# Patient Record
Sex: FEMALE | Race: WHITE | NOT HISPANIC OR LATINO | Employment: STUDENT | URBAN - METROPOLITAN AREA
[De-identification: names, ages, dates, MRNs, and addresses within clinical notes are randomized per-mention and may not be internally consistent; named-entity substitution may affect disease eponyms.]

---

## 2023-02-27 LAB
ALANINE AMINOTRANSFERASE (SGPT) (U/L) IN SER/PLAS: 12 U/L (ref 7–45)
ALBUMIN (G/DL) IN SER/PLAS: 4.6 G/DL (ref 3.4–5)
ALKALINE PHOSPHATASE (U/L) IN SER/PLAS: 84 U/L (ref 33–110)
ANION GAP IN SER/PLAS: 10 MMOL/L (ref 10–20)
ASPARTATE AMINOTRANSFERASE (SGOT) (U/L) IN SER/PLAS: 14 U/L (ref 9–39)
BASOPHILS (10*3/UL) IN BLOOD BY AUTOMATED COUNT: 0.07 X10E9/L (ref 0–0.1)
BASOPHILS/100 LEUKOCYTES IN BLOOD BY AUTOMATED COUNT: 1 % (ref 0–2)
BILIRUBIN TOTAL (MG/DL) IN SER/PLAS: 1.7 MG/DL (ref 0–1.2)
C REACTIVE PROTEIN (MG/L) IN SER/PLAS: <0.1 MG/DL
CALCIDIOL (25 OH VITAMIN D3) (NG/ML) IN SER/PLAS: 29 NG/ML
CALCIUM (MG/DL) IN SER/PLAS: 9.6 MG/DL (ref 8.6–10.6)
CARBON DIOXIDE, TOTAL (MMOL/L) IN SER/PLAS: 29 MMOL/L (ref 21–32)
CHLORIDE (MMOL/L) IN SER/PLAS: 107 MMOL/L (ref 98–107)
CREATININE (MG/DL) IN SER/PLAS: 0.68 MG/DL (ref 0.5–1.05)
EOSINOPHILS (10*3/UL) IN BLOOD BY AUTOMATED COUNT: 0.58 X10E9/L (ref 0–0.7)
EOSINOPHILS/100 LEUKOCYTES IN BLOOD BY AUTOMATED COUNT: 8.3 % (ref 0–6)
ERYTHROCYTE DISTRIBUTION WIDTH (RATIO) BY AUTOMATED COUNT: 12.4 % (ref 11.5–14.5)
ERYTHROCYTE MEAN CORPUSCULAR HEMOGLOBIN CONCENTRATION (G/DL) BY AUTOMATED: 33.5 G/DL (ref 32–36)
ERYTHROCYTE MEAN CORPUSCULAR VOLUME (FL) BY AUTOMATED COUNT: 86 FL (ref 80–100)
ERYTHROCYTES (10*6/UL) IN BLOOD BY AUTOMATED COUNT: 4.36 X10E12/L (ref 4–5.2)
GAMMA GLUTAMYL TRANSFERASE (U/L) IN SER/PLAS: 14 U/L (ref 5–55)
GFR FEMALE: >90 ML/MIN/1.73M2
GLUCOSE (MG/DL) IN SER/PLAS: 68 MG/DL (ref 74–99)
HEMATOCRIT (%) IN BLOOD BY AUTOMATED COUNT: 37.6 % (ref 36–46)
HEMOGLOBIN (G/DL) IN BLOOD: 12.6 G/DL (ref 12–16)
IMMATURE GRANULOCYTES/100 LEUKOCYTES IN BLOOD BY AUTOMATED COUNT: 0.3 % (ref 0–0.9)
LEUKOCYTES (10*3/UL) IN BLOOD BY AUTOMATED COUNT: 7 X10E9/L (ref 4.4–11.3)
LYMPHOCYTES (10*3/UL) IN BLOOD BY AUTOMATED COUNT: 2.58 X10E9/L (ref 1.2–4.8)
LYMPHOCYTES/100 LEUKOCYTES IN BLOOD BY AUTOMATED COUNT: 37 % (ref 13–44)
MONOCYTES (10*3/UL) IN BLOOD BY AUTOMATED COUNT: 0.38 X10E9/L (ref 0.1–1)
MONOCYTES/100 LEUKOCYTES IN BLOOD BY AUTOMATED COUNT: 5.5 % (ref 2–10)
NEUTROPHILS (10*3/UL) IN BLOOD BY AUTOMATED COUNT: 3.34 X10E9/L (ref 1.2–7.7)
NEUTROPHILS/100 LEUKOCYTES IN BLOOD BY AUTOMATED COUNT: 47.9 % (ref 40–80)
NRBC (PER 100 WBCS) BY AUTOMATED COUNT: 0 /100 WBC (ref 0–0)
PLATELETS (10*3/UL) IN BLOOD AUTOMATED COUNT: 251 X10E9/L (ref 150–450)
POTASSIUM (MMOL/L) IN SER/PLAS: 3.9 MMOL/L (ref 3.5–5.3)
PROTEIN TOTAL: 6.7 G/DL (ref 6.4–8.2)
SEDIMENTATION RATE, ERYTHROCYTE: 2 MM/H (ref 0–20)
SODIUM (MMOL/L) IN SER/PLAS: 142 MMOL/L (ref 136–145)
UREA NITROGEN (MG/DL) IN SER/PLAS: 11 MG/DL (ref 6–23)

## 2023-03-03 LAB
AB TO ADALIMUMAB(ATA) CONC.: <1.7 U/ML
ADA RESULTS: ABNORMAL
ADALIMUMAB(ADA) CONC.: 17 UG/ML

## 2023-03-04 LAB
NIL(NEG) CONTROL SPOT COUNT: NORMAL
PANEL A SPOT COUNT: 0
PANEL B SPOT COUNT: 1
POS CONTROL SPOT COUNT: NORMAL
T-SPOT. TB INTERPRETATION: NEGATIVE

## 2023-10-05 ENCOUNTER — DOCUMENTATION (OUTPATIENT)
Dept: PEDIATRIC GASTROENTEROLOGY | Facility: HOSPITAL | Age: 18
End: 2023-10-05
Payer: COMMERCIAL

## 2023-10-05 NOTE — PROGRESS NOTES
Concerns regarding needed follow up procedure not being scheduled. [employer] mailed a 90 day warning letter to the family/patient via certified mail requesting that the family/patient schedule a GI follow up. A copy of the letter will be scanned into the chart.

## 2023-10-07 ENCOUNTER — TELEPHONE (OUTPATIENT)
Dept: PEDIATRIC GASTROENTEROLOGY | Facility: CLINIC | Age: 18
End: 2023-10-07
Payer: COMMERCIAL

## 2023-10-26 DIAGNOSIS — K50.919 CROHN'S DISEASE WITH COMPLICATION, UNSPECIFIED GASTROINTESTINAL TRACT LOCATION (MULTI): ICD-10-CM

## 2023-12-11 ENCOUNTER — TELEPHONE (OUTPATIENT)
Dept: PEDIATRIC GASTROENTEROLOGY | Facility: CLINIC | Age: 18
End: 2023-12-11
Payer: COMMERCIAL

## 2023-12-11 NOTE — TELEPHONE ENCOUNTER
Attempted to contact to remind patient of IBD Tranistion appointment. Unable to reach at this time, left voicemail with details of appointment.

## 2023-12-20 ENCOUNTER — MEDICATION MANAGEMENT (OUTPATIENT)
Dept: PEDIATRIC GASTROENTEROLOGY | Facility: CLINIC | Age: 18
End: 2023-12-20

## 2023-12-20 ENCOUNTER — NUTRITION (OUTPATIENT)
Dept: PEDIATRIC GASTROENTEROLOGY | Facility: CLINIC | Age: 18
End: 2023-12-20

## 2023-12-20 ENCOUNTER — OFFICE VISIT (OUTPATIENT)
Dept: PEDIATRIC GASTROENTEROLOGY | Facility: CLINIC | Age: 18
End: 2023-12-20
Payer: COMMERCIAL

## 2023-12-20 VITALS
TEMPERATURE: 96.6 F | WEIGHT: 124.78 LBS | BODY MASS INDEX: 20.79 KG/M2 | SYSTOLIC BLOOD PRESSURE: 128 MMHG | DIASTOLIC BLOOD PRESSURE: 82 MMHG | HEART RATE: 80 BPM | HEIGHT: 65 IN

## 2023-12-20 DIAGNOSIS — K51.00 ULCERATIVE PANCOLITIS WITHOUT COMPLICATION (MULTI): Primary | ICD-10-CM

## 2023-12-20 PROCEDURE — 99215 OFFICE O/P EST HI 40 MIN: CPT | Performed by: STUDENT IN AN ORGANIZED HEALTH CARE EDUCATION/TRAINING PROGRAM

## 2023-12-20 ASSESSMENT — ENCOUNTER SYMPTOMS
BLOOD IN STOOL: 0
NUMBER OF DAILY STOOLS PAST SEVEN DAYS: 2
NUMBER OF DAILY LIQUID STOOLS PAST SEVEN DAYS: 0
FEVER >38.5 C ON THREE OF THE PAST SEVEN DAYS: 0
STOOL DESCRIPTION: FORMED

## 2023-12-20 NOTE — PROGRESS NOTES
DISEASE MAINTENANCE MEDICATION: Humira 40 mg every other week    TOLERANCE:   Have you experienced any side effects from this medication?     -Vanessa has experienced bruising, raised area for a few day, itchng for a day.  -We talked about appropriate administration and she confimed she has started to place Humira in a more “meatier” area and pressing lighter.  Bruising is lessened.    Are there any changes to current therapy regimen? No    EFFICACY:    Have you developed any new symptoms of your condition? No    How do you feel your medication is affecting your disease state?     -She feels normal    GOALS:  Your goals of therapy are: No symptoms    COMPLIANCE / ADHERENCE:  Have you had any unplanned missed doses? No    Does the patient have any barriers to self-administration (including physical and mental)? No    GENERAL ASSESSMENT:  Are there any changes to your home medications, OTCs or supplements? No changes, on no any other meds    Do you have any new allergies? No     Have you been diagnosed with any new medical conditions? No    PATIENT MANAGEMENT:    Do you have any questions regarding your medications, or care?     -None other than the administration side effects    Do you have any concerns with access to your medications? No    IMPRESSION/PLAN:     Doing well on Humira

## 2023-12-20 NOTE — PROGRESS NOTES
IBD CLINIC  Nutrition Intervention: Nutrition Therapy Education Session -IBD Multidisciplinary Clinic  Nutrition and GI Concerns:  No concerns. 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 Homecooked meals at least twice daily + snacks during day.  We are happy that her weight is up and Vanessa states she is too.  Today provided general healthy eating guidelines for IBD and micronutrient intake guidelines with IBD.  Patient was very receptive.

## 2023-12-20 NOTE — PROGRESS NOTES
ICN NOTEFORM  Vanessa is a 18 y.o. female with ulcerative colitis. Colectomy status: has not had a complete colectomy    18 year old F with ulcerative pancolitis (E4S1) diagnosed in 2019 who presents for follow up in  [facility].  This is a transition visit.    Briefly, she presented in 2019 with hematochezia and abdominal pain with precipitous drop in Hemoglobin.  She underwent EGD and colonoscopy which showed a pancolitis visually with biopsies notable for a normal TI, chronic colitis throughout the colon except for a normal transverse colon histologically.  Subsequent MRE showed possible mild TI thickening vs under distension.  Her picture was most consistent with IBD, UC picture at the time.  She received IV iron, IV steroids and was started on infliximab for which dose was optimized due to low levels and symptoms with subsequent mucosal and essentially deep healing on follow up endoscopies in 2020.  She did have a flare in disease in late 2021 in setting of missing infliximab infusions x 6 months due to lapse of insurance and was admitted for IV steroids and induction with adalimumab.  When she was last seen in February 2023, she was clinically doing well on 40 mg every other week of adalimumab with normal labs and an therapeutic adalimumab level of 17 but with continued weight loss >6 months so repeat EGD/colonoscopy was recommended.    Today she presents for follow up and is doing very well.  She has 1-2 formed BM per day, no blood, no nocturnal symptoms, no abdominal pain and no vomiting.  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  Her appetite is great.  She denies fevers, joint swelling, rashes, oral ulcers.  Is due for a dilated eye exam.    IBD History:  -Initial EGD/colonoscopy June 21, 2019: erythema, friability, inflammation from rectum to ascending colon, erythema and friable cecum.   "Biopsies with chronic gastritis, and chronic colitis throughout the colon except for a normal transverse colon biopsy  -Most recent EGD/colonoscopy August 14, 2020: normal colon and terminal ileum, biopsies with minimal LP edema in the left colon otherwise normal  -MR Enterography June 24, 2019: mild wall thickening and slightly increased enhancement of TI which may be due to under distension, normal colon   -Anser ADA 2/27/2023: 17, no antibodies    Extent of disease involvement   The extent of ulcerative colitis involvement:  pancolitis  There has been an episode of severe disease    Current symptoms (on the worst day in past 7 days)  She reports on the worst day her general well-being is normal.     Limitations in daily activities were described as: no limitations.    Abdominal pain: none.    Stool number on the worst day in past 7 days: 2  .  The number of liquid/watery stools per day was 0  .  Most of the stools were described as formed.     Nocturnal diarrhea: no  .  She reported no bloody stools  .   .    Extraintestinal manifestations:   Fever greater than 38.5C for 3 of last 7 days: no  Definite arthritis: no   Uveitis: no   Erythema nodosum:  no  Pyoderma gangrenosum: no     Blood pressure 128/82, pulse 80, temperature 35.9 °C (96.6 °F), height 1.663 m (5' 5.47\"), weight 56.6 kg (124 lb 12.5 oz).     Review of Systems:  All other systems have been reviewed and are negative for complaints unless stated in the HPI     Physical Exam:  Constitutional: in NAD  Head: atraumatic  Eyes: anicteric sclera, normal conjunctiva  Mouth: MMM  Respiratory: Breathing unlabored  CARD: no murmurs, normal S1/S2  Abdomen: soft, not tender, non distended, no organomegaly  Skin: no rashes  MSK: no joint swelling or erythema  Neuro: alert, moving all extremities      ICN Assessment:  Based on current information, my global assessment of current disease status is her disease is quiescent.   Vanessa's growth status is satisfactory.  " The overall nutritional status is satisfactory.      18 year old F with ulcerative pancolitis on Humira monotherapy in clinical remission.  She had issues with poor weight gain which have recently improved.  She will follow up in [place] with adult GI and I recommended her making appointment ASAP as Her [employer] assistance program forms need to be filled out for the following year in the new year.  TRAQ 92.    Her primary gastroenterologist will be Harry Jennings MD.

## 2023-12-21 ENCOUNTER — TELEPHONE (OUTPATIENT)
Dept: GASTROENTEROLOGY | Facility: CLINIC | Age: 18
End: 2023-12-21
Payer: COMMERCIAL

## 2024-01-29 NOTE — PROGRESS NOTES
Subjective     History of Present Illness:   Vanessa Metz is a 18 y.o. female who presents to GI clinic for ulcerative colitis.  She is transitioning from pediatric GI.  Please see following note.  Briefly, she presented in 2019 with hematochezia and abdominal pain with precipitous drop in Hemoglobin.  She underwent EGD and colonoscopy which showed a pancolitis visually with biopsies notable for a normal TI, chronic colitis throughout the colon except for a normal transverse colon histologically.  Subsequent MRE showed possible mild TI thickening vs under distension.  Her picture was most consistent with IBD, UC picture at the time.  She received IV iron, IV steroids and was started on infliximab for which dose was optimized due to low levels and symptoms with subsequent mucosal and essentially deep healing on follow up endoscopies in 2020.  She did have a flare in disease in late 2021 in setting of missing infliximab infusions x 6 months due to lapse of insurance and was admitted for IV steroids and induction with adalimumab.  When she was last seen in February 2023, she was clinically doing well on 40 mg every other week of adalimumab with normal labs and an therapeutic adalimumab level of 17 but with continued weight loss >6 months so repeat EGD/colonoscopy was recommended.  However the conclusion of the pediatric GI is that her colitis is quiescent.    Today, patient reported that she is here for transition of her care to the adult GI.  She does not have any main concerns.  She is having 1-2 formed bowel movements without any blood not associated with urgency.  She denied any nocturnal stools, joint pains, rash, mouth ulcers or sores.  She has gained 10 pounds in the last couple of months.  She relates that to her increased appetite.      IBD History:  -Initial EGD/colonoscopy June 21, 2019: erythema, friability, inflammation from rectum to ascending colon, erythema and friable cecum.  Biopsies with chronic  gastritis, and chronic colitis throughout the colon except for a normal transverse colon biopsy  -Most recent EGD/colonoscopy August 14, 2020: normal colon and terminal ileum, biopsies with minimal LP edema in the left colon otherwise normal  -MR Enterography June 24, 2019: mild wall thickening and slightly increased enhancement of TI which may be due to under distension, normal colon   -Anser ADA 2/27/2023: 17, no antibodies         Review of Systems    A 12 point review of system is negative except as above.  Past Medical History   has a past medical history of Other conditions influencing health status.     Social History    Smokes 1 to 2 cigarettes/day, denies drinking alcohol.    Family History  family history is not on file.     Allergies  Not on File    Medications  Current Outpatient Medications   Medication Instructions    adalimumab (Humira Pen) 40 mg/0.4 mL pen injector kit pen-injector INJECT 40 MG (1 PEN) UNDER THE SKIN EVERY OTHER WEEK. KEEP REFRIGERATED.        Objective     Physical Exam  Vitals signs reviewed.    General: not in acute distress  HEENT: atraumatic, normocephalic, no oral lesions  CV: regular rate and rhythm, no murmur  Resp: clear to auscultation bilaterally  GI: soft, active bowel sounds, non-tender to palpation, no rebound or guarding, no ascites  Rectal: no perianal lesion or purulence  Msk: no lower extremity edema  Derm: no jaundice  Psych: normal affect      [unfilled]       Assessment/Plan   Vanessa Metz is a 18 y.o. female who presents to GI clinic for To transition her care from pediatric GI for her ulcerative colitis.    She has been on Humira since February 2023 and her symptoms appear to be well-controlled.  Her last colonoscopy was in 2020.  We discussed that she needs repeat colonoscopy to look for endoscopic remission.  Her weight has been stable, she has gained 10 pounds over the last few months.    Plan:  - Colonoscopy at [facility] to evaluate for  ulcerative colitis.  - Will continue Humira 40 mg every other week.  - Advised smoking cessation and to keep her vaccinations up-to-date.  - Follow-up in a year or sooner if needed.          Eligio Nash MD

## 2024-01-30 ENCOUNTER — OFFICE VISIT (OUTPATIENT)
Dept: GASTROENTEROLOGY | Facility: CLINIC | Age: 19
End: 2024-01-30
Payer: COMMERCIAL

## 2024-01-30 VITALS
DIASTOLIC BLOOD PRESSURE: 61 MMHG | BODY MASS INDEX: 20.57 KG/M2 | HEIGHT: 66 IN | SYSTOLIC BLOOD PRESSURE: 109 MMHG | HEART RATE: 85 BPM | WEIGHT: 128 LBS

## 2024-01-30 DIAGNOSIS — K51.00 ULCERATIVE PANCOLITIS WITHOUT COMPLICATION (MULTI): Primary | ICD-10-CM

## 2024-01-30 PROCEDURE — 99204 OFFICE O/P NEW MOD 45 MIN: CPT | Performed by: INTERNAL MEDICINE

## 2024-01-30 RX ORDER — SODIUM CHLORIDE 9 MG/ML
20 INJECTION, SOLUTION INTRAVENOUS CONTINUOUS
Status: CANCELLED | OUTPATIENT
Start: 2024-01-30

## 2024-01-30 RX ORDER — SODIUM, POTASSIUM,MAG SULFATES 17.5-3.13G
1 SOLUTION, RECONSTITUTED, ORAL ORAL 2 TIMES DAILY
Qty: 2 EACH | Refills: 0 | Status: SHIPPED | OUTPATIENT
Start: 2024-01-30 | End: 2024-03-20 | Stop reason: ALTCHOICE

## 2024-02-08 ENCOUNTER — TELEPHONE (OUTPATIENT)
Dept: OPERATING ROOM | Facility: HOSPITAL | Age: 19
End: 2024-02-08
Payer: COMMERCIAL

## 2024-02-08 NOTE — TELEPHONE ENCOUNTER
Attempted to call Mom to schedule GI endoscopy procedures. No answer at this time, left message at [phone]. Instruction for recipient to call back at [phone]     Call attempt: 6

## 2024-03-05 ENCOUNTER — TELEPHONE (OUTPATIENT)
Dept: PEDIATRIC GASTROENTEROLOGY | Facility: HOSPITAL | Age: 19
End: 2024-03-05
Payer: COMMERCIAL

## 2024-03-05 ENCOUNTER — ANESTHESIA EVENT (OUTPATIENT)
Dept: GASTROENTEROLOGY | Facility: EXTERNAL LOCATION | Age: 19
End: 2024-03-05

## 2024-03-20 ENCOUNTER — ANESTHESIA (OUTPATIENT)
Dept: GASTROENTEROLOGY | Facility: EXTERNAL LOCATION | Age: 19
End: 2024-03-20

## 2024-03-20 ENCOUNTER — HOSPITAL ENCOUNTER (OUTPATIENT)
Dept: GASTROENTEROLOGY | Facility: EXTERNAL LOCATION | Age: 19
Discharge: HOME | End: 2024-03-20
Payer: COMMERCIAL

## 2024-03-20 ENCOUNTER — SPECIALTY PHARMACY (OUTPATIENT)
Dept: PHARMACY | Facility: CLINIC | Age: 19
End: 2024-03-20

## 2024-03-20 VITALS
RESPIRATION RATE: 13 BRPM | SYSTOLIC BLOOD PRESSURE: 101 MMHG | HEIGHT: 66 IN | DIASTOLIC BLOOD PRESSURE: 72 MMHG | BODY MASS INDEX: 20.57 KG/M2 | WEIGHT: 128 LBS | HEART RATE: 77 BPM | OXYGEN SATURATION: 99 % | TEMPERATURE: 98.1 F

## 2024-03-20 DIAGNOSIS — K50.118: ICD-10-CM

## 2024-03-20 PROBLEM — F17.200 SMOKER: Status: ACTIVE | Noted: 2024-03-20

## 2024-03-20 PROCEDURE — 88305 TISSUE EXAM BY PATHOLOGIST: CPT | Performed by: PATHOLOGY

## 2024-03-20 PROCEDURE — 45380 COLONOSCOPY AND BIOPSY: CPT | Performed by: INTERNAL MEDICINE

## 2024-03-20 PROCEDURE — 88305 TISSUE EXAM BY PATHOLOGIST: CPT | Mod: TC | Performed by: INTERNAL MEDICINE

## 2024-03-20 RX ORDER — SODIUM CHLORIDE 9 MG/ML
20 INJECTION, SOLUTION INTRAVENOUS CONTINUOUS
Status: DISCONTINUED | OUTPATIENT
Start: 2024-03-20 | End: 2024-03-21 | Stop reason: HOSPADM

## 2024-03-20 RX ORDER — PROPOFOL 10 MG/ML
INJECTION, EMULSION INTRAVENOUS AS NEEDED
Status: DISCONTINUED | OUTPATIENT
Start: 2024-03-20 | End: 2024-03-20

## 2024-03-20 RX ORDER — LIDOCAINE HYDROCHLORIDE 20 MG/ML
INJECTION, SOLUTION INFILTRATION; PERINEURAL AS NEEDED
Status: DISCONTINUED | OUTPATIENT
Start: 2024-03-20 | End: 2024-03-20

## 2024-03-20 RX ADMIN — PROPOFOL 20 MG: 10 INJECTION, EMULSION INTRAVENOUS at 14:05

## 2024-03-20 RX ADMIN — PROPOFOL 100 MG: 10 INJECTION, EMULSION INTRAVENOUS at 14:00

## 2024-03-20 RX ADMIN — PROPOFOL 30 MG: 10 INJECTION, EMULSION INTRAVENOUS at 14:03

## 2024-03-20 RX ADMIN — PROPOFOL 100 MG: 10 INJECTION, EMULSION INTRAVENOUS at 13:51

## 2024-03-20 RX ADMIN — SODIUM CHLORIDE: 9 INJECTION, SOLUTION INTRAVENOUS at 13:48

## 2024-03-20 RX ADMIN — LIDOCAINE HYDROCHLORIDE 20 MG: 20 INJECTION, SOLUTION INFILTRATION; PERINEURAL at 13:51

## 2024-03-20 SDOH — HEALTH STABILITY: MENTAL HEALTH: CURRENT SMOKER: 1

## 2024-03-20 ASSESSMENT — PAIN - FUNCTIONAL ASSESSMENT
PAIN_FUNCTIONAL_ASSESSMENT: 0-10

## 2024-03-20 ASSESSMENT — COLUMBIA-SUICIDE SEVERITY RATING SCALE - C-SSRS
6. HAVE YOU EVER DONE ANYTHING, STARTED TO DO ANYTHING, OR PREPARED TO DO ANYTHING TO END YOUR LIFE?: NO
2. HAVE YOU ACTUALLY HAD ANY THOUGHTS OF KILLING YOURSELF?: NO
1. IN THE PAST MONTH, HAVE YOU WISHED YOU WERE DEAD OR WISHED YOU COULD GO TO SLEEP AND NOT WAKE UP?: NO

## 2024-03-20 ASSESSMENT — PAIN SCALES - GENERAL
PAINLEVEL_OUTOF10: 0 - NO PAIN
PAIN_LEVEL: 0
PAINLEVEL_OUTOF10: 0 - NO PAIN

## 2024-03-20 NOTE — ANESTHESIA POSTPROCEDURE EVALUATION
Patient: Vanessa Metz    Procedure Summary       Date: 03/20/24 Room / Location: [facility]    Anesthesia Start: 1349 Anesthesia Stop:     Procedure: COLONOSCOPY Diagnosis: CC (Crohn's colitis), other complication (CMS/HCC)    Scheduled Providers: Eligio Nash MD; Kirsty Rock RN; DAMI Fang Responsible Provider: DAMI Fang    Anesthesia Type: MAC ASA Status: 2            Anesthesia Type: MAC    Vitals Value Taken Time   BP 96/63 03/20/24 1411   Temp 36.7 03/20/24 1411   Pulse 81 03/20/24 1411   Resp 16 03/20/24 1411   SpO2 97 03/20/24 1411       Anesthesia Post Evaluation    Patient location during evaluation: bedside  Patient participation: complete - patient participated  Level of consciousness: awake  Pain score: 0  Pain management: adequate  Airway patency: patent  Cardiovascular status: acceptable  Respiratory status: acceptable and room air  Hydration status: acceptable  Postoperative Nausea and Vomiting: none      No notable events documented.

## 2024-03-20 NOTE — ANESTHESIA PREPROCEDURE EVALUATION
Patient: Vanessa Metz    Procedure Information       Date/Time: 03/20/24 1300    Scheduled providers: Eligio Nash MD; Kirsty Rock RN; DAMI Fang    Procedure: COLONOSCOPY    Location: [facility]            Relevant Problems   No relevant active problems       Clinical information reviewed:   Tobacco  Allergies  Meds   Med Hx  Surg Hx  OB Status  Fam Hx  Soc   Hx        NPO Detail:  NPO/Void Status  Carbohydrate Drink Given Prior to Surgery? : N  Date of Last Liquid: 03/20/24  Time of Last Liquid: 0800  Date of Last Solid: 03/18/24  Time of Last Solid: 1800  Last Intake Type: Clear fluids  Time of Last Void: 1313         Physical Exam    Airway  Mallampati: I  TM distance: >3 FB  Neck ROM: full     Cardiovascular - normal exam     Dental    Pulmonary - normal exam     Abdominal - normal exam         Anesthesia Plan    History of general anesthesia?: yes  History of complications of general anesthesia?: no    ASA 2     MAC   (Preoxygenated 2L prior to procedure.  Patient positioned self to comfort prior to sedation administered; eyes closed; continuous monitoring)  The patient is a current smoker.  Patient was previously instructed to abstain from smoking on day of procedure.  Patient did not smoke on day of procedure.    intravenous induction   Anesthetic plan and risks discussed with patient.    Plan discussed with CRNA.

## 2024-03-20 NOTE — DISCHARGE INSTRUCTIONS
Patient Instructions Post Procedure      The anesthetics, sedatives or narcotics which were given to you today will be acting in your body for the next 24 hours, so you might feel a little sleepy or groggy.  This feeling should slowly wear off. Carefully read and follow the instructions.     You received sedation today:  - Do not drive or operate any machinery or power tools of any kind.   - No alcoholic beverages today, not even beer or wine.  - Do not make any important decisions or sign any legal documents.  - No over the counter medications that contain alcohol or that may cause drowsiness.    While it is common to experience mild to moderate abdominal distention, gas, or belching after your procedure, if any of these symptoms occur following discharge from the GI Lab or within one week of having your procedure, call the [facility] [place] to be advised whether a visit to your nearest Urgent Care or Emergency Department is indicated.  Take this paper with you if you go.   - If you develop an allergic reaction to the medications that were given during your procedure such as difficulty breathing, rash, hives, severe nausea, vomiting or lightheadedness.  - If you experience chest pain, shortness of breath, severe abdominal pain, fevers and chills.  -If you develop signs and symptoms of bleeding such as blood in your spit, if your stools turn black, tarry, or bloody  - If you have not urinated within 8 hours following your procedure.  - If your IV site becomes painful, red, inflamed, or looks infected.    If you received a biopsy/polypectomy/sphincterotomy the following instructions apply below:  __ Do not use Aspirin containing products, non-steroidal medications or anti-coagulants for one week following your procedure. (Examples of these types of medications are: Advil, Arthrotec, Aleve, Coumadin, Ecotrin, Heparin, Ibuprofen, Indocin, Motrin, Naprosyn, Nuprin, Plavix, Vioxx, and Voltarin, or their generic  forms.  This list is not all-inclusive.  Check with your physician or pharmacist before resuming medications.)   __ Eat a soft diet today.  Avoid foods that are poorly digested for the next 24 hours.  These foods would include: nuts, beans, lettuce, red meats, and fried foods. Start with liquids and advance your diet as tolerated, gradually work up to eating solids.   __ Do not have a Barium Study or Enema for one week.    Your physician recommends the additional following instructions:    -You have a contact number available for emergencies. The signs and symptoms of potential delayed complications were discussed with you. You may return to normal activities tomorrow.  -Resume your previous diet or other if specified.  -Continue your present medications.   -We are waiting for your pathology results, if applicable.  -The findings and recommendations have been discussed with you and/or family.  - Please see Medication Reconciliation Form for new medication/medications prescribed.     If you experience any problems or have any questions following discharge from the GI Lab, please call: [phone] from 7 am- 4:30 pm.  In the event of an emergency please go to the closest Emergency Department or call Dr. Nash [phone] for any physician related concerns or if is after 5pm, a holiday or weekend

## 2024-03-20 NOTE — SIGNIFICANT EVENT
Physician at bedside to discuss procedure results with patient  Ride updated for discharge time via phone  Tolerating PO fluids

## 2024-03-20 NOTE — H&P
Procedure H&P    Patient Profile-Procedures  Name Vanessa Metz  Date of Birth 2005  MRN [id number]  Address   [address] [id number] [place] [place] [address]    Primary Phone Number [phone]  Secondary Phone Number    PCP Willy Keller    Procedure(s):  Procedures: Colonoscopy  Primary contact name and number   Extended Emergency Contact Information  Primary Emergency Contact: Sagrario Metz  Home Phone: [phone]  Relation: Parent    General Health  Weight   Vitals:    03/20/24 1312   Weight: 58.1 kg (128 lb)     BMI Body mass index is 20.66 kg/m².    Allergies  No Known Allergies    Past Medical History   Past Medical History:   Diagnosis Date    Other conditions influencing health status     No prior hospitalisations    Ulcerative colitis (CMS/[facility])        Provider assessment  Diagnosis:  UC  Medication Reviewed - yes  Prior to Admission medications    Medication Sig Start Date End Date Taking? Authorizing Provider   adalimumab (Humira Pen) 40 mg/0.4 mL pen injector kit pen-injector Inject 1 Pen (40 mg) under the skin every 14 (fourteen) days.   Yes Historical Provider, MD   sodium,potassium,mag sulfates (Suprep) 17.5-3.13-1.6 gram recon soln solution Take 1 bottle by mouth 2 times a day. 1/30/24 3/20/24  Eligio Nash MD       Physical Exam  Vitals:    03/20/24 1312   BP: 108/72   Pulse: 87   Resp: 18   Temp: 37 °C (98.6 °F)   SpO2: 100%        General: A&Ox3, NAD.  HEENT: AT/NC.   CV: RRR. No murmur.  Resp: CTA bilaterally. No wheezing, rhonchi or rales.   GI: Soft, NT/ND. BSx4.  Extrem: No edema. Pulses intact.  Neuro: No focal deficits.   Psych: Normal mood and affect.      Procedure Plan - pre-procedural (re)assesment completed by physician:  discharge/transfer patient when discharge criteria met    ASA status 2  Mallampati score 1    Eligio Nash MD  3/20/2024 1:50 PM

## 2024-03-21 ENCOUNTER — TELEPHONE (OUTPATIENT)
Dept: GASTROENTEROLOGY | Facility: CLINIC | Age: 19
End: 2024-03-21
Payer: COMMERCIAL

## 2024-03-21 DIAGNOSIS — K50.118: Primary | ICD-10-CM

## 2024-03-27 LAB
LABORATORY COMMENT REPORT: NORMAL
PATH REPORT.FINAL DX SPEC: NORMAL
PATH REPORT.GROSS SPEC: NORMAL
PATH REPORT.TOTAL CANCER: NORMAL

## 2024-04-02 ENCOUNTER — SPECIALTY PHARMACY (OUTPATIENT)
Dept: PHARMACY | Facility: CLINIC | Age: 19
End: 2024-04-02

## 2024-04-03 ENCOUNTER — PHARMACY VISIT (OUTPATIENT)
Dept: PHARMACY | Facility: CLINIC | Age: 19
End: 2024-04-03
Payer: COMMERCIAL

## 2024-04-03 PROCEDURE — RXMED WILLOW AMBULATORY MEDICATION CHARGE

## 2024-04-05 ENCOUNTER — SPECIALTY PHARMACY (OUTPATIENT)
Dept: PHARMACY | Facility: CLINIC | Age: 19
End: 2024-04-05

## 2024-04-23 ENCOUNTER — SPECIALTY PHARMACY (OUTPATIENT)
Dept: PHARMACY | Facility: CLINIC | Age: 19
End: 2024-04-23

## 2024-04-23 PROCEDURE — RXMED WILLOW AMBULATORY MEDICATION CHARGE

## 2024-04-24 ENCOUNTER — PHARMACY VISIT (OUTPATIENT)
Dept: PHARMACY | Facility: CLINIC | Age: 19
End: 2024-04-24
Payer: COMMERCIAL

## 2024-05-16 ENCOUNTER — SPECIALTY PHARMACY (OUTPATIENT)
Dept: PHARMACY | Facility: CLINIC | Age: 19
End: 2024-05-16

## 2024-05-16 PROCEDURE — RXMED WILLOW AMBULATORY MEDICATION CHARGE

## 2024-05-20 ENCOUNTER — PHARMACY VISIT (OUTPATIENT)
Dept: PHARMACY | Facility: CLINIC | Age: 19
End: 2024-05-20
Payer: COMMERCIAL

## 2024-06-02 ENCOUNTER — HOSPITAL ENCOUNTER (INPATIENT)
Facility: HOSPITAL | Age: 19
LOS: 1 days | Discharge: HOME | DRG: 871 | End: 2024-06-04
Attending: EMERGENCY MEDICINE
Payer: COMMERCIAL

## 2024-06-02 ENCOUNTER — APPOINTMENT (OUTPATIENT)
Dept: RADIOLOGY | Facility: HOSPITAL | Age: 19
DRG: 871 | End: 2024-06-02
Payer: COMMERCIAL

## 2024-06-02 DIAGNOSIS — A41.9 SEPSIS WITHOUT SEPTIC SHOCK (MULTI): Primary | ICD-10-CM

## 2024-06-02 DIAGNOSIS — J18.9 PNEUMONIA OF RIGHT LUNG DUE TO INFECTIOUS ORGANISM, UNSPECIFIED PART OF LUNG: ICD-10-CM

## 2024-06-02 DIAGNOSIS — N30.90 CYSTITIS: ICD-10-CM

## 2024-06-02 LAB
ALBUMIN SERPL BCP-MCNC: 3.9 G/DL (ref 3.4–5)
ALP SERPL-CCNC: 84 U/L (ref 33–110)
ALT SERPL W P-5'-P-CCNC: 9 U/L (ref 7–45)
AMORPH CRY #/AREA UR COMP ASSIST: ABNORMAL /HPF
ANION GAP SERPL CALC-SCNC: 13 MMOL/L (ref 10–20)
APPEARANCE UR: ABNORMAL
AST SERPL W P-5'-P-CCNC: 9 U/L (ref 9–39)
B-HCG SERPL-ACNC: 5 MIU/ML
BACTERIA #/AREA URNS AUTO: ABNORMAL /HPF
BASOPHILS # BLD AUTO: 0.05 X10*3/UL (ref 0–0.1)
BASOPHILS NFR BLD AUTO: 0.2 %
BILIRUB SERPL-MCNC: 1.2 MG/DL (ref 0–1.2)
BILIRUB UR STRIP.AUTO-MCNC: NEGATIVE MG/DL
BUN SERPL-MCNC: 7 MG/DL (ref 6–23)
CALCIUM SERPL-MCNC: 9.1 MG/DL (ref 8.6–10.3)
CHLORIDE SERPL-SCNC: 95 MMOL/L (ref 98–107)
CO2 SERPL-SCNC: 24 MMOL/L (ref 21–32)
COLOR UR: ABNORMAL
CREAT SERPL-MCNC: 0.63 MG/DL (ref 0.5–1.05)
EGFRCR SERPLBLD CKD-EPI 2021: >90 ML/MIN/1.73M*2
EOSINOPHIL # BLD AUTO: 0 X10*3/UL (ref 0–0.7)
EOSINOPHIL NFR BLD AUTO: 0 %
ERYTHROCYTE [DISTWIDTH] IN BLOOD BY AUTOMATED COUNT: 12.1 % (ref 11.5–14.5)
GLUCOSE SERPL-MCNC: 176 MG/DL (ref 74–99)
GLUCOSE UR STRIP.AUTO-MCNC: NORMAL MG/DL
HCG UR QL IA.RAPID: NEGATIVE
HCT VFR BLD AUTO: 38.6 % (ref 36–46)
HGB BLD-MCNC: 13.4 G/DL (ref 12–16)
IMM GRANULOCYTES # BLD AUTO: 0.34 X10*3/UL (ref 0–0.7)
IMM GRANULOCYTES NFR BLD AUTO: 1.1 % (ref 0–0.9)
KETONES UR STRIP.AUTO-MCNC: NEGATIVE MG/DL
LACTATE SERPL-SCNC: 0.7 MMOL/L (ref 0.4–2)
LEUKOCYTE ESTERASE UR QL STRIP.AUTO: ABNORMAL
LIPASE SERPL-CCNC: 4 U/L (ref 9–82)
LYMPHOCYTES # BLD AUTO: 1.62 X10*3/UL (ref 1.2–4.8)
LYMPHOCYTES NFR BLD AUTO: 5.4 %
MCH RBC QN AUTO: 30.2 PG (ref 26–34)
MCHC RBC AUTO-ENTMCNC: 34.7 G/DL (ref 32–36)
MCV RBC AUTO: 87 FL (ref 80–100)
MONOCYTES # BLD AUTO: 1.89 X10*3/UL (ref 0.1–1)
MONOCYTES NFR BLD AUTO: 6.4 %
MUCOUS THREADS #/AREA URNS AUTO: ABNORMAL /LPF
NEUTROPHILS # BLD AUTO: 25.84 X10*3/UL (ref 1.2–7.7)
NEUTROPHILS NFR BLD AUTO: 86.9 %
NITRITE UR QL STRIP.AUTO: NEGATIVE
NRBC BLD-RTO: 0 /100 WBCS (ref 0–0)
PH UR STRIP.AUTO: 6.5 [PH]
PLATELET # BLD AUTO: 280 X10*3/UL (ref 150–450)
POTASSIUM SERPL-SCNC: 3.9 MMOL/L (ref 3.5–5.3)
PROT SERPL-MCNC: 7.6 G/DL (ref 6.4–8.2)
PROT UR STRIP.AUTO-MCNC: ABNORMAL MG/DL
RBC # BLD AUTO: 4.43 X10*6/UL (ref 4–5.2)
RBC # UR STRIP.AUTO: ABNORMAL /UL
RBC #/AREA URNS AUTO: ABNORMAL /HPF
SODIUM SERPL-SCNC: 128 MMOL/L (ref 136–145)
SP GR UR STRIP.AUTO: 1.01
SQUAMOUS #/AREA URNS AUTO: ABNORMAL /HPF
UROBILINOGEN UR STRIP.AUTO-MCNC: NORMAL MG/DL
WBC # BLD AUTO: 29.7 X10*3/UL (ref 4.4–11.3)
WBC #/AREA URNS AUTO: ABNORMAL /HPF

## 2024-06-02 PROCEDURE — 84702 CHORIONIC GONADOTROPIN TEST: CPT | Performed by: PHYSICIAN ASSISTANT

## 2024-06-02 PROCEDURE — 96375 TX/PRO/DX INJ NEW DRUG ADDON: CPT

## 2024-06-02 PROCEDURE — 71046 X-RAY EXAM CHEST 2 VIEWS: CPT

## 2024-06-02 PROCEDURE — 81001 URINALYSIS AUTO W/SCOPE: CPT | Performed by: PHYSICIAN ASSISTANT

## 2024-06-02 PROCEDURE — 2550000001 HC RX 255 CONTRASTS: Performed by: EMERGENCY MEDICINE

## 2024-06-02 PROCEDURE — 82570 ASSAY OF URINE CREATININE: CPT

## 2024-06-02 PROCEDURE — 87086 URINE CULTURE/COLONY COUNT: CPT | Mod: PARLAB | Performed by: PHYSICIAN ASSISTANT

## 2024-06-02 PROCEDURE — 83605 ASSAY OF LACTIC ACID: CPT | Performed by: PHYSICIAN ASSISTANT

## 2024-06-02 PROCEDURE — 36415 COLL VENOUS BLD VENIPUNCTURE: CPT | Performed by: PHYSICIAN ASSISTANT

## 2024-06-02 PROCEDURE — 71275 CT ANGIOGRAPHY CHEST: CPT

## 2024-06-02 PROCEDURE — 74177 CT ABD & PELVIS W/CONTRAST: CPT | Performed by: RADIOLOGY

## 2024-06-02 PROCEDURE — 85025 COMPLETE CBC W/AUTO DIFF WBC: CPT | Performed by: PHYSICIAN ASSISTANT

## 2024-06-02 PROCEDURE — 71275 CT ANGIOGRAPHY CHEST: CPT | Performed by: RADIOLOGY

## 2024-06-02 PROCEDURE — 81025 URINE PREGNANCY TEST: CPT | Performed by: PHYSICIAN ASSISTANT

## 2024-06-02 PROCEDURE — 71046 X-RAY EXAM CHEST 2 VIEWS: CPT | Performed by: STUDENT IN AN ORGANIZED HEALTH CARE EDUCATION/TRAINING PROGRAM

## 2024-06-02 PROCEDURE — 96361 HYDRATE IV INFUSION ADD-ON: CPT

## 2024-06-02 PROCEDURE — 83690 ASSAY OF LIPASE: CPT | Performed by: PHYSICIAN ASSISTANT

## 2024-06-02 PROCEDURE — 83935 ASSAY OF URINE OSMOLALITY: CPT | Mod: PARLAB

## 2024-06-02 PROCEDURE — 2500000004 HC RX 250 GENERAL PHARMACY W/ HCPCS (ALT 636 FOR OP/ED): Performed by: PHYSICIAN ASSISTANT

## 2024-06-02 PROCEDURE — 74177 CT ABD & PELVIS W/CONTRAST: CPT

## 2024-06-02 PROCEDURE — 99285 EMERGENCY DEPT VISIT HI MDM: CPT | Mod: 25

## 2024-06-02 PROCEDURE — 96365 THER/PROPH/DIAG IV INF INIT: CPT

## 2024-06-02 PROCEDURE — 80053 COMPREHEN METABOLIC PANEL: CPT | Performed by: PHYSICIAN ASSISTANT

## 2024-06-02 PROCEDURE — 87040 BLOOD CULTURE FOR BACTERIA: CPT | Mod: PARLAB | Performed by: PHYSICIAN ASSISTANT

## 2024-06-02 RX ORDER — FENTANYL CITRATE 50 UG/ML
50 INJECTION, SOLUTION INTRAMUSCULAR; INTRAVENOUS ONCE
Status: COMPLETED | OUTPATIENT
Start: 2024-06-02 | End: 2024-06-02

## 2024-06-02 RX ORDER — ONDANSETRON HYDROCHLORIDE 2 MG/ML
4 INJECTION, SOLUTION INTRAVENOUS ONCE
Status: COMPLETED | OUTPATIENT
Start: 2024-06-02 | End: 2024-06-02

## 2024-06-02 RX ADMIN — IOHEXOL 75 ML: 350 INJECTION, SOLUTION INTRAVENOUS at 23:09

## 2024-06-02 RX ADMIN — ONDANSETRON 4 MG: 2 INJECTION INTRAMUSCULAR; INTRAVENOUS at 22:51

## 2024-06-02 RX ADMIN — SODIUM CHLORIDE 1000 ML: 9 INJECTION, SOLUTION INTRAVENOUS at 20:52

## 2024-06-02 RX ADMIN — SODIUM CHLORIDE 1500 ML: 9 INJECTION, SOLUTION INTRAVENOUS at 23:15

## 2024-06-02 RX ADMIN — FENTANYL CITRATE 50 MCG: 50 INJECTION INTRAMUSCULAR; INTRAVENOUS at 22:51

## 2024-06-02 RX ADMIN — PIPERACILLIN SODIUM AND TAZOBACTAM SODIUM 4.5 G: 4; .5 INJECTION, SOLUTION INTRAVENOUS at 23:15

## 2024-06-02 ASSESSMENT — LIFESTYLE VARIABLES
HAVE PEOPLE ANNOYED YOU BY CRITICIZING YOUR DRINKING: NO
EVER FELT BAD OR GUILTY ABOUT YOUR DRINKING: NO
HAVE YOU EVER FELT YOU SHOULD CUT DOWN ON YOUR DRINKING: NO
TOTAL SCORE: 0
EVER HAD A DRINK FIRST THING IN THE MORNING TO STEADY YOUR NERVES TO GET RID OF A HANGOVER: NO

## 2024-06-02 ASSESSMENT — PAIN DESCRIPTION - ORIENTATION: ORIENTATION: RIGHT

## 2024-06-02 ASSESSMENT — PAIN DESCRIPTION - PAIN TYPE: TYPE: ACUTE PAIN

## 2024-06-02 ASSESSMENT — PAIN SCALES - GENERAL
PAINLEVEL_OUTOF10: 10 - WORST POSSIBLE PAIN

## 2024-06-02 ASSESSMENT — PAIN DESCRIPTION - LOCATION
LOCATION: BACK
LOCATION: OTHER (COMMENT)

## 2024-06-02 ASSESSMENT — COLUMBIA-SUICIDE SEVERITY RATING SCALE - C-SSRS
2. HAVE YOU ACTUALLY HAD ANY THOUGHTS OF KILLING YOURSELF?: NO
1. IN THE PAST MONTH, HAVE YOU WISHED YOU WERE DEAD OR WISHED YOU COULD GO TO SLEEP AND NOT WAKE UP?: NO
6. HAVE YOU EVER DONE ANYTHING, STARTED TO DO ANYTHING, OR PREPARED TO DO ANYTHING TO END YOUR LIFE?: NO

## 2024-06-02 ASSESSMENT — PAIN DESCRIPTION - DESCRIPTORS: DESCRIPTORS: STABBING

## 2024-06-02 ASSESSMENT — PAIN - FUNCTIONAL ASSESSMENT
PAIN_FUNCTIONAL_ASSESSMENT: 0-10
PAIN_FUNCTIONAL_ASSESSMENT: 0-10

## 2024-06-02 ASSESSMENT — PAIN DESCRIPTION - PROGRESSION: CLINICAL_PROGRESSION: NOT CHANGED

## 2024-06-02 NOTE — ED TRIAGE NOTES
Pt comes into ed via private auto. Pt states right flank pain x2 days. Described as stabbing. Pt states no urinary symptoms. Pt states productive cough x2 weeks.

## 2024-06-03 PROBLEM — A41.9 SEPSIS WITHOUT SEPTIC SHOCK (MULTI): Status: ACTIVE | Noted: 2024-06-03

## 2024-06-03 LAB
ANION GAP SERPL CALC-SCNC: 9 MMOL/L (ref 10–20)
BUN SERPL-MCNC: 5 MG/DL (ref 6–23)
CALCIUM SERPL-MCNC: 8.1 MG/DL (ref 8.6–10.3)
CHLORIDE SERPL-SCNC: 103 MMOL/L (ref 98–107)
CO2 SERPL-SCNC: 24 MMOL/L (ref 21–32)
CREAT SERPL-MCNC: 0.56 MG/DL (ref 0.5–1.05)
CREAT UR-MCNC: 68 MG/DL (ref 20–320)
EGFRCR SERPLBLD CKD-EPI 2021: >90 ML/MIN/1.73M*2
ERYTHROCYTE [DISTWIDTH] IN BLOOD BY AUTOMATED COUNT: 12.4 % (ref 11.5–14.5)
GLUCOSE SERPL-MCNC: 98 MG/DL (ref 74–99)
HCT VFR BLD AUTO: 33.8 % (ref 36–46)
HGB BLD-MCNC: 11.3 G/DL (ref 12–16)
HOLD SPECIMEN: NORMAL
MCH RBC QN AUTO: 30.1 PG (ref 26–34)
MCHC RBC AUTO-ENTMCNC: 33.4 G/DL (ref 32–36)
MCV RBC AUTO: 90 FL (ref 80–100)
NRBC BLD-RTO: 0 /100 WBCS (ref 0–0)
OSMOLALITY SERPL: 272 MOSM/KG (ref 280–300)
OSMOLALITY UR: 201 MOSM/KG (ref 200–1200)
PLATELET # BLD AUTO: 223 X10*3/UL (ref 150–450)
POTASSIUM SERPL-SCNC: 3.7 MMOL/L (ref 3.5–5.3)
PROCALCITONIN SERPL-MCNC: 0.54 NG/ML
RBC # BLD AUTO: 3.75 X10*6/UL (ref 4–5.2)
SODIUM SERPL-SCNC: 132 MMOL/L (ref 136–145)
SODIUM UR-SCNC: <10 MMOL/L
SODIUM/CREAT UR-RTO: NORMAL
WBC # BLD AUTO: 24.2 X10*3/UL (ref 4.4–11.3)

## 2024-06-03 PROCEDURE — 2500000001 HC RX 250 WO HCPCS SELF ADMINISTERED DRUGS (ALT 637 FOR MEDICARE OP)

## 2024-06-03 PROCEDURE — 1200000002 HC GENERAL ROOM WITH TELEMETRY DAILY

## 2024-06-03 PROCEDURE — 87449 NOS EACH ORGANISM AG IA: CPT | Mod: PARLAB

## 2024-06-03 PROCEDURE — 87899 AGENT NOS ASSAY W/OPTIC: CPT | Mod: PARLAB

## 2024-06-03 PROCEDURE — 2500000002 HC RX 250 W HCPCS SELF ADMINISTERED DRUGS (ALT 637 FOR MEDICARE OP, ALT 636 FOR OP/ED)

## 2024-06-03 PROCEDURE — C9113 INJ PANTOPRAZOLE SODIUM, VIA: HCPCS

## 2024-06-03 PROCEDURE — 99222 1ST HOSP IP/OBS MODERATE 55: CPT | Performed by: INTERNAL MEDICINE

## 2024-06-03 PROCEDURE — 85027 COMPLETE CBC AUTOMATED: CPT

## 2024-06-03 PROCEDURE — 87205 SMEAR GRAM STAIN: CPT | Mod: PARLAB

## 2024-06-03 PROCEDURE — 96376 TX/PRO/DX INJ SAME DRUG ADON: CPT

## 2024-06-03 PROCEDURE — 80048 BASIC METABOLIC PNL TOTAL CA: CPT

## 2024-06-03 PROCEDURE — 2500000004 HC RX 250 GENERAL PHARMACY W/ HCPCS (ALT 636 FOR OP/ED): Performed by: PHYSICIAN ASSISTANT

## 2024-06-03 PROCEDURE — 96367 TX/PROPH/DG ADDL SEQ IV INF: CPT

## 2024-06-03 PROCEDURE — 2500000004 HC RX 250 GENERAL PHARMACY W/ HCPCS (ALT 636 FOR OP/ED)

## 2024-06-03 PROCEDURE — 84145 PROCALCITONIN (PCT): CPT | Mod: PARLAB

## 2024-06-03 PROCEDURE — 83930 ASSAY OF BLOOD OSMOLALITY: CPT | Mod: PARLAB

## 2024-06-03 PROCEDURE — 36415 COLL VENOUS BLD VENIPUNCTURE: CPT

## 2024-06-03 PROCEDURE — 2500000004 HC RX 250 GENERAL PHARMACY W/ HCPCS (ALT 636 FOR OP/ED): Performed by: EMERGENCY MEDICINE

## 2024-06-03 RX ORDER — ACETAMINOPHEN 650 MG/1
650 SUPPOSITORY RECTAL EVERY 4 HOURS PRN
Status: DISCONTINUED | OUTPATIENT
Start: 2024-06-03 | End: 2024-06-04 | Stop reason: HOSPADM

## 2024-06-03 RX ORDER — ALBUTEROL SULFATE 0.83 MG/ML
3 SOLUTION RESPIRATORY (INHALATION) EVERY 6 HOURS PRN
Status: DISCONTINUED | OUTPATIENT
Start: 2024-06-03 | End: 2024-06-03

## 2024-06-03 RX ORDER — IPRATROPIUM BROMIDE AND ALBUTEROL SULFATE 2.5; .5 MG/3ML; MG/3ML
3 SOLUTION RESPIRATORY (INHALATION) 3 TIMES DAILY
Status: DISCONTINUED | OUTPATIENT
Start: 2024-06-03 | End: 2024-06-03

## 2024-06-03 RX ORDER — IPRATROPIUM BROMIDE AND ALBUTEROL SULFATE 2.5; .5 MG/3ML; MG/3ML
3 SOLUTION RESPIRATORY (INHALATION)
Status: DISCONTINUED | OUTPATIENT
Start: 2024-06-03 | End: 2024-06-03

## 2024-06-03 RX ORDER — IPRATROPIUM BROMIDE AND ALBUTEROL SULFATE 2.5; .5 MG/3ML; MG/3ML
3 SOLUTION RESPIRATORY (INHALATION) EVERY 2 HOUR PRN
Status: DISCONTINUED | OUTPATIENT
Start: 2024-06-03 | End: 2024-06-04 | Stop reason: HOSPADM

## 2024-06-03 RX ORDER — VENLAFAXINE HYDROCHLORIDE 75 MG/1
75 CAPSULE, EXTENDED RELEASE ORAL NIGHTLY
COMMUNITY

## 2024-06-03 RX ORDER — PANTOPRAZOLE SODIUM 40 MG/10ML
40 INJECTION, POWDER, LYOPHILIZED, FOR SOLUTION INTRAVENOUS DAILY
Status: DISCONTINUED | OUTPATIENT
Start: 2024-06-03 | End: 2024-06-04 | Stop reason: HOSPADM

## 2024-06-03 RX ORDER — FENTANYL CITRATE 50 UG/ML
50 INJECTION, SOLUTION INTRAMUSCULAR; INTRAVENOUS ONCE
Status: COMPLETED | OUTPATIENT
Start: 2024-06-03 | End: 2024-06-03

## 2024-06-03 RX ORDER — HYDROMORPHONE HYDROCHLORIDE 1 MG/ML
1 INJECTION, SOLUTION INTRAMUSCULAR; INTRAVENOUS; SUBCUTANEOUS ONCE
Status: COMPLETED | OUTPATIENT
Start: 2024-06-03 | End: 2024-06-03

## 2024-06-03 RX ORDER — ACETAMINOPHEN 325 MG/1
650 TABLET ORAL EVERY 4 HOURS PRN
Status: DISCONTINUED | OUTPATIENT
Start: 2024-06-03 | End: 2024-06-04 | Stop reason: HOSPADM

## 2024-06-03 RX ORDER — TRAZODONE HYDROCHLORIDE 50 MG/1
50 TABLET ORAL NIGHTLY
COMMUNITY

## 2024-06-03 RX ORDER — ACETAMINOPHEN 160 MG/5ML
650 SOLUTION ORAL EVERY 4 HOURS PRN
Status: DISCONTINUED | OUTPATIENT
Start: 2024-06-03 | End: 2024-06-04 | Stop reason: HOSPADM

## 2024-06-03 RX ORDER — VENLAFAXINE HYDROCHLORIDE 75 MG/1
75 CAPSULE, EXTENDED RELEASE ORAL NIGHTLY
Status: DISCONTINUED | OUTPATIENT
Start: 2024-06-03 | End: 2024-06-04 | Stop reason: HOSPADM

## 2024-06-03 RX ORDER — SODIUM CHLORIDE 9 MG/ML
100 INJECTION, SOLUTION INTRAVENOUS CONTINUOUS
Status: DISCONTINUED | OUTPATIENT
Start: 2024-06-03 | End: 2024-06-04

## 2024-06-03 RX ORDER — GUAIFENESIN 600 MG/1
600 TABLET, EXTENDED RELEASE ORAL 2 TIMES DAILY
Status: DISCONTINUED | OUTPATIENT
Start: 2024-06-03 | End: 2024-06-04 | Stop reason: HOSPADM

## 2024-06-03 RX ORDER — BENZONATATE 100 MG/1
100 CAPSULE ORAL 3 TIMES DAILY PRN
Status: DISCONTINUED | OUTPATIENT
Start: 2024-06-03 | End: 2024-06-04 | Stop reason: HOSPADM

## 2024-06-03 RX ORDER — TRAZODONE HYDROCHLORIDE 50 MG/1
50 TABLET ORAL NIGHTLY
Status: DISCONTINUED | OUTPATIENT
Start: 2024-06-03 | End: 2024-06-04 | Stop reason: HOSPADM

## 2024-06-03 RX ORDER — ONDANSETRON HYDROCHLORIDE 2 MG/ML
4 INJECTION, SOLUTION INTRAVENOUS EVERY 4 HOURS PRN
Status: DISCONTINUED | OUTPATIENT
Start: 2024-06-03 | End: 2024-06-04 | Stop reason: HOSPADM

## 2024-06-03 RX ADMIN — SODIUM CHLORIDE 100 ML/HR: 9 INJECTION, SOLUTION INTRAVENOUS at 14:33

## 2024-06-03 RX ADMIN — VENLAFAXINE HYDROCHLORIDE 75 MG: 75 CAPSULE, EXTENDED RELEASE ORAL at 21:07

## 2024-06-03 RX ADMIN — PIPERACILLIN SODIUM AND TAZOBACTAM SODIUM 3.38 G: 3; .375 INJECTION, SOLUTION INTRAVENOUS at 11:34

## 2024-06-03 RX ADMIN — PIPERACILLIN SODIUM AND TAZOBACTAM SODIUM 3.38 G: 3; .375 INJECTION, SOLUTION INTRAVENOUS at 05:48

## 2024-06-03 RX ADMIN — PANTOPRAZOLE SODIUM 40 MG: 40 INJECTION, POWDER, FOR SOLUTION INTRAVENOUS at 05:48

## 2024-06-03 RX ADMIN — HYDROMORPHONE HYDROCHLORIDE 1 MG: 1 INJECTION, SOLUTION INTRAMUSCULAR; INTRAVENOUS; SUBCUTANEOUS at 03:25

## 2024-06-03 RX ADMIN — TRAZODONE HYDROCHLORIDE 50 MG: 50 TABLET ORAL at 21:06

## 2024-06-03 RX ADMIN — SODIUM CHLORIDE 100 ML/HR: 9 INJECTION, SOLUTION INTRAVENOUS at 03:11

## 2024-06-03 RX ADMIN — ACETAMINOPHEN 650 MG: 325 TABLET ORAL at 18:53

## 2024-06-03 RX ADMIN — GUAIFENESIN 600 MG: 600 TABLET, EXTENDED RELEASE ORAL at 21:06

## 2024-06-03 RX ADMIN — PIPERACILLIN SODIUM AND TAZOBACTAM SODIUM 3.38 G: 3; .375 INJECTION, SOLUTION INTRAVENOUS at 18:33

## 2024-06-03 RX ADMIN — ACETAMINOPHEN 650 MG: 325 TABLET ORAL at 10:11

## 2024-06-03 RX ADMIN — GUAIFENESIN 600 MG: 600 TABLET, EXTENDED RELEASE ORAL at 08:12

## 2024-06-03 RX ADMIN — BENZONATATE 100 MG: 100 CAPSULE ORAL at 08:12

## 2024-06-03 RX ADMIN — FENTANYL CITRATE 50 MCG: 50 INJECTION INTRAMUSCULAR; INTRAVENOUS at 01:11

## 2024-06-03 RX ADMIN — AZITHROMYCIN MONOHYDRATE 500 MG: 500 INJECTION, POWDER, LYOPHILIZED, FOR SOLUTION INTRAVENOUS at 00:45

## 2024-06-03 ASSESSMENT — PAIN - FUNCTIONAL ASSESSMENT
PAIN_FUNCTIONAL_ASSESSMENT: 0-10

## 2024-06-03 ASSESSMENT — COGNITIVE AND FUNCTIONAL STATUS - GENERAL
MOBILITY SCORE: 24
DAILY ACTIVITIY SCORE: 24
PATIENT BASELINE BEDBOUND: NO

## 2024-06-03 ASSESSMENT — ACTIVITIES OF DAILY LIVING (ADL)
GROOMING: INDEPENDENT
JUDGMENT_ADEQUATE_SAFELY_COMPLETE_DAILY_ACTIVITIES: YES
TOILETING: INDEPENDENT
DRESSING YOURSELF: INDEPENDENT
HEARING - RIGHT EAR: FUNCTIONAL
HEARING - LEFT EAR: FUNCTIONAL
WALKS IN HOME: INDEPENDENT
FEEDING YOURSELF: INDEPENDENT
BATHING: INDEPENDENT
ADEQUATE_TO_COMPLETE_ADL: YES
PATIENT'S MEMORY ADEQUATE TO SAFELY COMPLETE DAILY ACTIVITIES?: YES

## 2024-06-03 ASSESSMENT — PAIN SCALES - GENERAL
PAINLEVEL_OUTOF10: 10 - WORST POSSIBLE PAIN
PAINLEVEL_OUTOF10: 6
PAINLEVEL_OUTOF10: 0 - NO PAIN
PAINLEVEL_OUTOF10: 10 - WORST POSSIBLE PAIN
PAINLEVEL_OUTOF10: 5 - MODERATE PAIN

## 2024-06-03 ASSESSMENT — PAIN DESCRIPTION - ORIENTATION: ORIENTATION: RIGHT

## 2024-06-03 NOTE — CONSULTS
"Nutrition Initial Assessment:   Nutrition Assessment    Reason for Assessment: Dietitian discretion (low BMI)    Patient is a 19 y.o. female presenting with sepsis, c/o of right flank pain.   PMHx: ulcerative colitis on Humira       Nutrition History:  Energy Intake: Fair 50-75 %  Food and Nutrient History: Per patient, appetite could be better. Little PO intake for the past 3 days. For breakfast pt was able to eat some fruit, milk, and muffin. Drinks Equate protein shake at home. Reports that ulcerative colitis symptoms are manageable however weight does tend to fluctuate due to this diagnosis.  Vitamin/Herbal Supplement Use: None per home med records  Food Allergies/Intolerances:  None  GI Symptoms: None  Oral Problems: None       Anthropometrics:  Height: 170 cm (5' 6.93\")   Weight: 50.8 kg (111 lb 15.9 oz)   BMI (Calculated): 17.58  IBW/kg (Dietitian Calculated): 61.4 kg          Weight History:     Weight Change %:  Weight History / % Weight Change: 13% wt loss x 2.5 months  Significant Weight Loss: Yes  Interpretation of Weight Loss: >7.5% in 3 months (although wt loss is not specifically within 3 months, still considered significant.)  Reports UBW fluctuates between 105-115lb.     Nutrition Focused Physical Exam Findings:    Subcutaneous Fat Loss:   Orbital Fat Pads: Well nourished (slightly bulging fat pads)  Buccal Fat Pads: Well nourished (full, rounded cheeks)  Triceps: Well nourished (ample fat tissue)  Ribs: Defer  Muscle Wasting:  Temporalis: Well nourished (well-defined muscle)  Pectoralis (Clavicular Region): Well nourished (clavicle not visible)  Deltoid/Trapezius: Well nourished (rounded appearance at arm, shoulder, neck)  Interosseous: Defer  Trapezius/Infraspinatus/Supraspinatus (Scapular Region): Defer  Quadriceps: Defer  Gastrocnemius: Defer  Edema:  Edema: none  Edema Location: Per nursing assessment  Physical Findings:  Skin: Negative    Nutrition Significant Labs:  BMP Trend:   Results from " last 7 days   Lab Units 06/03/24  0709 06/02/24  2038   GLUCOSE mg/dL 98 176*   CALCIUM mg/dL 8.1* 9.1   SODIUM mmol/L 132* 128*   POTASSIUM mmol/L 3.7 3.9   CO2 mmol/L 24 24   CHLORIDE mmol/L 103 95*   BUN mg/dL 5* 7   CREATININE mg/dL 0.56 0.63        Nutrition Specific Medications:  Reviewed.     I/O:   Last BM Date: 06/03/24;      Dietary Orders (From admission, onward)       Start     Ordered    06/03/24 0113  Adult diet Regular  Diet effective now        Question:  Diet type  Answer:  Regular    06/03/24 0117                     Estimated Needs:      Method for Estimating Needs: 1530-1785kcals (30-35kcals/kgABW)     Method for Estimating Needs: 61-77g (1.2-1.5g/kg ABW)     Method for Estimating Needs: 1ml/kcal or per MD        Nutrition Diagnosis   Malnutrition Diagnosis  Patient has Malnutrition Diagnosis: No    Nutrition Diagnosis  Patient has Nutrition Diagnosis: Yes  Diagnosis Status (1): New  Nutrition Diagnosis 1: Unintended weight loss  Related to (1): chronic disease  As Evidenced by (1): ulcerative colitis, 13% wt loss x 2.5 months, poor PO intake for 3 days PTA.       Nutrition Interventions/Recommendations         Nutrition Prescription:  Individualized Nutrition Prescription Provided for : Continue regular diet, pt agreeable to ensure plus high protein daily        Nutrition Interventions:   Interventions: Meals and snacks, Medical food supplement  Meals and Snacks: General healthful diet  Goal: 3 meals per day or 4-6 small meals  Medical Food Supplement: Commercial beverage  Goal: Ensure Plus High Protein Daily (for an additional 350 kcals, 20 gm protein each)    Collaboration and Referral of Nutrition Care:  (spoke with pt)    Nutrition Education:      Pt denied needs at this time.     Nutrition Monitoring and Evaluation   Food/Nutrient Related History Monitoring  Monitoring and Evaluation Plan: Energy intake, Fluid intake, Amount of food  Energy Intake: Estimated energy intake  Criteria:  Meal/ONS intake to meet >75% of estimated needs  Fluid Intake: Estimated fluid intake  Criteria: fluid intake to meet >75% of estimated need  Amount of Food: Estimated amout of food, Medical food intake  Criteria: Pt to consume >75% of meals/ONS    Body Composition/Growth/Weight History  Monitoring and Evaluation Plan: Weight  Weight: Measured weight  Criteria: Reweigh at least every 5 days    Biochemical Data, Medical Tests and Procedures  Monitoring and Evaluation Plan: Electrolyte/renal panel, Glucose/endocrine profile  Criteria: Labs to stay WNL    Nutrition Focused Physical Findings  Monitoring and Evaluation Plan: Skin, Digestive System  Criteria: Regular BM every 2-3 days  Criteria: maintenance of skin integrity       Time Spent/Follow-up Reminder:   Time Spent (min): 60 minutes  Last Date of Nutrition Visit: 06/03/24  Nutrition Follow-Up Needed?: 3-5 days  Follow up Comment: 6/7 BRITTANEY

## 2024-06-03 NOTE — H&P
Department of Anesthesiology  Postprocedure Note    Patient: Daniel Rich  MRN: [id number]  YOB: 1953  Date of evaluation: 9/14/2020  Time:  10:52 AM     Procedure Summary     Date:  09/14/20 Room / Location:  [address]    Anesthesia Start:  1027 Anesthesia Stop:      Procedure:  COLONOSCOPY BIOPSY (N/A ) Diagnosis:  (diverticulitis, abdominal pain)    Surgeon:  Ronel Lopez MD Responsible Provider:  AXEL Chappell CRNA    Anesthesia Type:  general, TIVA ASA Status:  3          Anesthesia Type: general, TIVA    Farheen Phase I: Farheen Score: 10    Farheen Phase II:      Last vitals: Reviewed and per EMR flowsheets.        Anesthesia Post Evaluation    Patient location during evaluation: PACU  Patient participation: complete - patient participated  Level of consciousness: awake and alert  Pain score: 0  Airway patency: patent  Nausea & Vomiting: no nausea and no vomiting  Complications: no  Cardiovascular status: blood pressure returned to baseline and hemodynamically stable  Respiratory status: acceptable  Hydration status: euvolemic History Of Present Illness  Vanessa Metz is a 19 y.o. female with past medical history of ulcerative colitis on Humira, who presented to [facility] ED today with right flank pain and a cough. States over a week ago, she started to feel ill with intermittent shortness of breath that is worse with deep inspiration, productive cough with green sputum, low back pain, neck pain, nausea, vomiting, and right side/abdominal pain. States her pain is worse with movement. States she thought she would get better but feels worse. Denies fever or chills. Denies urinary symptoms, diarrhea, or constipation. LMP: 5 days ago. GI: Dr. Nash.    ED Course: Hemodynamically stable, afebrile. /82, , RR 20, 95% RA. EKG unavailable for my review. Labs: Glucose 176. Sodium 128, chloride 95. Lipase 4. Lactate 0.7. WBC 29.7, neutrophils 25.84. UA: turbid, 3+blood, +leukocytes, 4+bacteria, WBC 11-20. Urine and blood cultures sent. Azithromycin, fentanyl, Zofran, Zosyn, and IVF given in ED. Patient will be admitted under the care of Dr. Marcellus Rivera who will continue to follow pt. I was asked to H&P and place initial admission orders.     Past Medical History  Past Medical History:   Diagnosis Date    Other conditions influencing health status     No prior hospitalisations    Ulcerative colitis (Multi)        Surgical History  Past Surgical History:   Procedure Laterality Date    OTHER SURGICAL HISTORY  12/10/2019    Complete colonoscopy    OTHER SURGICAL HISTORY  12/10/2019    Esophagogastroduodenoscopy        Social History  Current smoker (4 cigarettes/day). Occasional alcohol use. Denies drug use.    Family History  No family history on file.     Allergies  Patient has no known allergies.    Review of Systems   10 point ROS reviewed and otherwise negative.  Physical Exam  Constitutional:       Appearance: She is ill-appearing.   HENT:      Head: Normocephalic and atraumatic.      Nose: Nose normal.      Mouth/Throat:       "Mouth: Mucous membranes are moist.      Pharynx: Oropharynx is clear.   Eyes:      Extraocular Movements: Extraocular movements intact.      Conjunctiva/sclera: Conjunctivae normal.      Pupils: Pupils are equal, round, and reactive to light.   Cardiovascular:      Rate and Rhythm: Regular rhythm. Tachycardia present.      Pulses: Normal pulses.      Heart sounds: Normal heart sounds.   Pulmonary:      Effort: Pulmonary effort is normal.      Breath sounds: Normal breath sounds.   Abdominal:      General: Abdomen is flat. Bowel sounds are normal.      Palpations: Abdomen is soft.      Tenderness: There is abdominal tenderness. There is right CVA tenderness and left CVA tenderness.      Comments: RUQ and RLQ tenderness with palpation. CVA tenderness bilaterally.   Musculoskeletal:         General: Normal range of motion.      Cervical back: Normal range of motion and neck supple.   Skin:     General: Skin is warm and dry.      Capillary Refill: Capillary refill takes less than 2 seconds.   Neurological:      General: No focal deficit present.      Mental Status: She is alert and oriented to person, place, and time.   Psychiatric:         Mood and Affect: Mood normal.         Behavior: Behavior normal.         Thought Content: Thought content normal.         Judgment: Judgment normal.          Last Recorded Vitals  Blood pressure 109/61, pulse (!) 107, temperature 37.2 °C (98.9 °F), temperature source Temporal, resp. rate 20, height 1.7 m (5' 6.93\"), weight 49.9 kg (110 lb), SpO2 96%.    Relevant Results  Results for orders placed or performed during the hospital encounter of 06/02/24 (from the past 24 hour(s))   CBC and Auto Differential   Result Value Ref Range    WBC 29.7 (H) 4.4 - 11.3 x10*3/uL    nRBC 0.0 0.0 - 0.0 /100 WBCs    RBC 4.43 4.00 - 5.20 x10*6/uL    Hemoglobin 13.4 12.0 - 16.0 g/dL    Hematocrit 38.6 36.0 - 46.0 %    MCV 87 80 - 100 fL    MCH 30.2 26.0 - 34.0 pg    MCHC 34.7 32.0 - 36.0 g/dL    RDW " 12.1 11.5 - 14.5 %    Platelets 280 150 - 450 x10*3/uL    Neutrophils % 86.9 40.0 - 80.0 %    Immature Granulocytes %, Automated 1.1 (H) 0.0 - 0.9 %    Lymphocytes % 5.4 13.0 - 44.0 %    Monocytes % 6.4 2.0 - 10.0 %    Eosinophils % 0.0 0.0 - 6.0 %    Basophils % 0.2 0.0 - 2.0 %    Neutrophils Absolute 25.84 (H) 1.20 - 7.70 x10*3/uL    Immature Granulocytes Absolute, Automated 0.34 0.00 - 0.70 x10*3/uL    Lymphocytes Absolute 1.62 1.20 - 4.80 x10*3/uL    Monocytes Absolute 1.89 (H) 0.10 - 1.00 x10*3/uL    Eosinophils Absolute 0.00 0.00 - 0.70 x10*3/uL    Basophils Absolute 0.05 0.00 - 0.10 x10*3/uL   Comprehensive metabolic panel   Result Value Ref Range    Glucose 176 (H) 74 - 99 mg/dL    Sodium 128 (L) 136 - 145 mmol/L    Potassium 3.9 3.5 - 5.3 mmol/L    Chloride 95 (L) 98 - 107 mmol/L    Bicarbonate 24 21 - 32 mmol/L    Anion Gap 13 10 - 20 mmol/L    Urea Nitrogen 7 6 - 23 mg/dL    Creatinine 0.63 0.50 - 1.05 mg/dL    eGFR >90 >60 mL/min/1.73m*2    Calcium 9.1 8.6 - 10.3 mg/dL    Albumin 3.9 3.4 - 5.0 g/dL    Alkaline Phosphatase 84 33 - 110 U/L    Total Protein 7.6 6.4 - 8.2 g/dL    AST 9 9 - 39 U/L    Bilirubin, Total 1.2 0.0 - 1.2 mg/dL    ALT 9 7 - 45 U/L   Lipase   Result Value Ref Range    Lipase 4 (L) 9 - 82 U/L   Human Chorionic Gonadotropin, Serum Quantitative   Result Value Ref Range    HCG, Beta-Quantitative 5 (H) <5 mIU/mL   Lactate   Result Value Ref Range    Lactate 0.7 0.4 - 2.0 mmol/L   Urinalysis with Reflex Culture and Microscopic   Result Value Ref Range    Color, Urine Light-Yellow Light-Yellow, Yellow, Dark-Yellow    Appearance, Urine Turbid (N) Clear    Specific Gravity, Urine 1.007 1.005 - 1.035    pH, Urine 6.5 5.0, 5.5, 6.0, 6.5, 7.0, 7.5, 8.0    Protein, Urine 10 (TRACE) NEGATIVE, 10 (TRACE), 20 (TRACE) mg/dL    Glucose, Urine Normal Normal mg/dL    Blood, Urine 1.0 (3+) (A) NEGATIVE    Ketones, Urine NEGATIVE NEGATIVE mg/dL    Bilirubin, Urine NEGATIVE NEGATIVE    Urobilinogen, Urine  Normal Normal mg/dL    Nitrite, Urine NEGATIVE NEGATIVE    Leukocyte Esterase, Urine 75 Josué/µL (A) NEGATIVE   hCG, Urine, Qualitative   Result Value Ref Range    HCG, Urine NEGATIVE NEGATIVE   Microscopic Only, Urine   Result Value Ref Range    WBC, Urine 11-20 (A) 1-5, NONE /HPF    RBC, Urine 3-5 NONE, 1-2, 3-5 /HPF    Squamous Epithelial Cells, Urine 1-9 (SPARSE) Reference range not established. /HPF    Bacteria, Urine 4+ (A) NONE SEEN /HPF    Mucus, Urine FEW Reference range not established. /LPF    Amorphous Crystals, Urine 1+ NONE, 1+, 2+ /HPF     CT abdomen pelvis w IV contrast    Result Date: 6/3/2024  Interpreted By:  Jody Smith, STUDY: CT ANGIO CHEST FOR PULMONARY EMBOLISM; CT ABDOMEN PELVIS W IV CONTRAST;  6/2/2024 11:07 pm   INDICATION: Signs/Symptoms:Pleuritic pain; Signs/Symptoms:abd pain.   COMPARISON: None.   ACCESSION NUMBER(S): [id number]; [id number]   ORDERING CLINICIAN: VAN LAM   TECHNIQUE: Axial CT images of the chest, abdomen and pelvis with coronal and sagittal reconstructed images obtained after intravenous administration of contrast. Maximum intensity projection images of the chest were reconstructed and reviewed..   FINDINGS: CHEST:   VESSELS: No aortic aneurysm. No pulmonary embolism. HEART: Normal size.  No pericardial effusion. MEDIASTINUM AND VIRIDIANA: Borderline enlarged right hilar lymph nodes measuring up to 10 mm in short axis are likely reactive. LUNG, PLEURA, LARGE AIRWAYS: No pleural effusion or pneumothorax. Dense consolidation in the posterior segment of the right lower lobe. CHEST WALL AND LOWER NECK: Within normal limits. No acute osseous abnormality.   ABDOMEN:   BONES: No acute osseous abnormality. ABDOMINAL WALL: Within normal limits.   LIVER: Within normal limits. BILE DUCTS: No biliary dilatation. GALLBLADDER: No calcified gallstones. No pericholecystic inflammatory changes. PANCREAS: Within normal limits. SPLEEN: Within normal limits. ADRENALS: Within normal  limits. KIDNEYS AND URETERS: Symmetric renal enhancement. No hydronephrosis or perinephric fluid collection. Subcentimeter cortical hypodense lesions in the right kidney are too small to characterize, but statistically likely small cysts.  No hydroureter.   VESSELS: The aorta and IVC are within normal limits. RETROPERITONEUM: No pathologically enlarged lymph nodes.   PELVIS:   REPRODUCTIVE ORGANS: No pelvic mass or significant free pelvic fluid. Follicular changes in the right ovary. Small amount of simple free pelvic fluid is nonspecific but may be physiologic. BLADDER: Within normal limits.   BOWEL: No dilated bowel.  The appendix is not seen. No pericecal inflammatory changes to suggest acute appendicitis. PERITONEUM: No ascites or free air, no fluid collection.       Right lower lobe pneumonia.   No pulmonary embolism.   No acute abdominal or pelvic abnormality. The appendix is not seen with certainty, however, there are no right lower quadrant inflammatory changes to suggest acute appendicitis.   MACRO: None   Signed by: Jody Smith 6/3/2024 12:20 AM Dictation workstation:   [id number]    CT angio chest for pulmonary embolism    Result Date: 6/3/2024  Interpreted By:  Jody Smith, STUDY: CT ANGIO CHEST FOR PULMONARY EMBOLISM; CT ABDOMEN PELVIS W IV CONTRAST;  6/2/2024 11:07 pm   INDICATION: Signs/Symptoms:Pleuritic pain; Signs/Symptoms:abd pain.   COMPARISON: None.   ACCESSION NUMBER(S): [id number]; [id number]   ORDERING CLINICIAN: VAN LAM   TECHNIQUE: Axial CT images of the chest, abdomen and pelvis with coronal and sagittal reconstructed images obtained after intravenous administration of contrast. Maximum intensity projection images of the chest were reconstructed and reviewed..   FINDINGS: CHEST:   VESSELS: No aortic aneurysm. No pulmonary embolism. HEART: Normal size.  No pericardial effusion. MEDIASTINUM AND VIRIDIANA: Borderline enlarged right hilar lymph nodes measuring up to 10 mm in short  axis are likely reactive. LUNG, PLEURA, LARGE AIRWAYS: No pleural effusion or pneumothorax. Dense consolidation in the posterior segment of the right lower lobe. CHEST WALL AND LOWER NECK: Within normal limits. No acute osseous abnormality.   ABDOMEN:   BONES: No acute osseous abnormality. ABDOMINAL WALL: Within normal limits.   LIVER: Within normal limits. BILE DUCTS: No biliary dilatation. GALLBLADDER: No calcified gallstones. No pericholecystic inflammatory changes. PANCREAS: Within normal limits. SPLEEN: Within normal limits. ADRENALS: Within normal limits. KIDNEYS AND URETERS: Symmetric renal enhancement. No hydronephrosis or perinephric fluid collection. Subcentimeter cortical hypodense lesions in the right kidney are too small to characterize, but statistically likely small cysts.  No hydroureter.   VESSELS: The aorta and IVC are within normal limits. RETROPERITONEUM: No pathologically enlarged lymph nodes.   PELVIS:   REPRODUCTIVE ORGANS: No pelvic mass or significant free pelvic fluid. Follicular changes in the right ovary. Small amount of simple free pelvic fluid is nonspecific but may be physiologic. BLADDER: Within normal limits.   BOWEL: No dilated bowel.  The appendix is not seen. No pericecal inflammatory changes to suggest acute appendicitis. PERITONEUM: No ascites or free air, no fluid collection.       Right lower lobe pneumonia.   No pulmonary embolism.   No acute abdominal or pelvic abnormality. The appendix is not seen with certainty, however, there are no right lower quadrant inflammatory changes to suggest acute appendicitis.   MACRO: None   Signed by: Jody Smith 6/3/2024 12:20 AM Dictation workstation:   [id number]    XR chest 2 views    Result Date: 6/2/2024  Interpreted By:  Óscar Cruz, STUDY: XR CHEST 2 VIEWS;  6/2/2024 8:25 pm   INDICATION: Signs/Symptoms:cough.   COMPARISON: 06/21/2019   ACCESSION NUMBER(S): [id number]   ORDERING CLINICIAN: VAN LAM   FINDINGS:     The  cardiomediastinal silhouette and pulmonary vasculature are within normal limits. No consolidation, pleural effusion or pneumothorax.         No acute cardiopulmonary process.     MACRO: None.   Signed by: Óscar Cruz 6/2/2024 8:34 PM Dictation workstation:   [id number]        Assessment/Plan   Principal Problem:    Sepsis without septic shock (Multi)    19 year old female with past medical history of ulcerative colitis on Humira, who presented to [facility] ED today with right flank pain and a cough. States over a week ago, she started to feel ill with intermittent shortness of breath that is worse with deep inspiration, productive cough with green sputum, low back pain, neck pain, nausea, vomiting, and right side/abdominal pain. Continue antibiotics, nebulizers, fluids. Patient will be hospitalized for further medical management.    #Suspect sepsis 2/2 acute cystitis and pneumonia  #Suspect pneumonia RLL  #Acute cystitis   #Hyponatremia  #Leukocytosis  #Abdominal pain  #Flank pain  #Nausea/vomiting  #Cough  Admit to inpatient/telemetry to Dr. Marcellus Rivera  See imaging results above  Continue Azithromycin and zosyn  Replete electrolytes  NS@75ml/hr  Zofran PRN  Tylenol PRN  Follow blood and urine cultures  Serum and urine osmolality and sodium urine spot ordered  Repeat labs in AM    Chronic conditions  #Ulcerative colitis  Continue home meds as appropriate when nursing completes home med rec  Smoking cessation (refused nicotine patch)  Regular diet  Full code    #DVT prophylaxis  Ambulate  SCD's as tolerated    I spent 35 minutes in the professional and overall care of this patient.    Jeni Marc, APRN-CNP

## 2024-06-03 NOTE — H&P
History Of Present Illness  Vanessa Metz is a 19 y.o. female with past medical history of ulcerative colitis on Humira, who presented to [facility] ED today with right flank pain and a cough. States over a week ago, she started to feel ill with intermittent shortness of breath that is worse with deep inspiration, productive cough with green sputum, low back pain, neck pain, nausea, vomiting, and right side/abdominal pain. States her pain is worse with movement. States she thought she would get better but feels worse. Denies fever or chills. Denies urinary symptoms, diarrhea, or constipation. LMP: 5 days ago. GI: Dr. Nash.    ED Course: Hemodynamically stable, afebrile. /82, , RR 20, 95% RA. EKG unavailable for my review. Labs: Glucose 176. Sodium 128, chloride 95. Lipase 4. Lactate 0.7. WBC 29.7, neutrophils 25.84. UA: turbid, 3+blood, +leukocytes, 4+bacteria, WBC 11-20. Urine and blood cultures sent. Azithromycin, fentanyl, Zofran, Zosyn, and IVF given in ED. Patient will be admitted under the care of Dr. Marcellus Rivera who will continue to follow pt. I was asked to H&P and place initial admission orders.     Past Medical History  Past Medical History:   Diagnosis Date    Other conditions influencing health status     No prior hospitalisations    Ulcerative colitis (Multi)        Surgical History  Past Surgical History:   Procedure Laterality Date    OTHER SURGICAL HISTORY  12/10/2019    Complete colonoscopy    OTHER SURGICAL HISTORY  12/10/2019    Esophagogastroduodenoscopy        Social History  Current smoker (4 cigarettes/day). Occasional alcohol use. Denies drug use.    Family History  No family history on file.     Allergies  Patient has no known allergies.    Review of Systems   10 point ROS reviewed and otherwise negative.  Physical Exam  Constitutional:       Appearance: She is ill-appearing.   HENT:      Head: Normocephalic and atraumatic.      Nose: Nose normal.      Mouth/Throat:       "Mouth: Mucous membranes are moist.      Pharynx: Oropharynx is clear.   Eyes:      Extraocular Movements: Extraocular movements intact.      Conjunctiva/sclera: Conjunctivae normal.      Pupils: Pupils are equal, round, and reactive to light.   Cardiovascular:      Rate and Rhythm: Regular rhythm. Tachycardia present.      Pulses: Normal pulses.      Heart sounds: Normal heart sounds.   Pulmonary:      Effort: Pulmonary effort is normal.      Breath sounds: Normal breath sounds.   Abdominal:      General: Abdomen is flat. Bowel sounds are normal.      Palpations: Abdomen is soft.      Tenderness: There is abdominal tenderness. There is right CVA tenderness and left CVA tenderness.      Comments: RUQ and RLQ tenderness with palpation. CVA tenderness bilaterally.   Musculoskeletal:         General: Normal range of motion.      Cervical back: Normal range of motion and neck supple.   Skin:     General: Skin is warm and dry.      Capillary Refill: Capillary refill takes less than 2 seconds.   Neurological:      General: No focal deficit present.      Mental Status: She is alert and oriented to person, place, and time.   Psychiatric:         Mood and Affect: Mood normal.         Behavior: Behavior normal.         Thought Content: Thought content normal.         Judgment: Judgment normal.          Last Recorded Vitals  Blood pressure 93/53, pulse 81, temperature 36.2 °C (97.2 °F), resp. rate 18, height 1.7 m (5' 6.93\"), weight 50.8 kg (111 lb 15.9 oz), SpO2 95%.    Relevant Results  Results for orders placed or performed during the hospital encounter of 06/02/24 (from the past 24 hour(s))   CBC and Auto Differential   Result Value Ref Range    WBC 29.7 (H) 4.4 - 11.3 x10*3/uL    nRBC 0.0 0.0 - 0.0 /100 WBCs    RBC 4.43 4.00 - 5.20 x10*6/uL    Hemoglobin 13.4 12.0 - 16.0 g/dL    Hematocrit 38.6 36.0 - 46.0 %    MCV 87 80 - 100 fL    MCH 30.2 26.0 - 34.0 pg    MCHC 34.7 32.0 - 36.0 g/dL    RDW 12.1 11.5 - 14.5 %    " Platelets 280 150 - 450 x10*3/uL    Neutrophils % 86.9 40.0 - 80.0 %    Immature Granulocytes %, Automated 1.1 (H) 0.0 - 0.9 %    Lymphocytes % 5.4 13.0 - 44.0 %    Monocytes % 6.4 2.0 - 10.0 %    Eosinophils % 0.0 0.0 - 6.0 %    Basophils % 0.2 0.0 - 2.0 %    Neutrophils Absolute 25.84 (H) 1.20 - 7.70 x10*3/uL    Immature Granulocytes Absolute, Automated 0.34 0.00 - 0.70 x10*3/uL    Lymphocytes Absolute 1.62 1.20 - 4.80 x10*3/uL    Monocytes Absolute 1.89 (H) 0.10 - 1.00 x10*3/uL    Eosinophils Absolute 0.00 0.00 - 0.70 x10*3/uL    Basophils Absolute 0.05 0.00 - 0.10 x10*3/uL   Comprehensive metabolic panel   Result Value Ref Range    Glucose 176 (H) 74 - 99 mg/dL    Sodium 128 (L) 136 - 145 mmol/L    Potassium 3.9 3.5 - 5.3 mmol/L    Chloride 95 (L) 98 - 107 mmol/L    Bicarbonate 24 21 - 32 mmol/L    Anion Gap 13 10 - 20 mmol/L    Urea Nitrogen 7 6 - 23 mg/dL    Creatinine 0.63 0.50 - 1.05 mg/dL    eGFR >90 >60 mL/min/1.73m*2    Calcium 9.1 8.6 - 10.3 mg/dL    Albumin 3.9 3.4 - 5.0 g/dL    Alkaline Phosphatase 84 33 - 110 U/L    Total Protein 7.6 6.4 - 8.2 g/dL    AST 9 9 - 39 U/L    Bilirubin, Total 1.2 0.0 - 1.2 mg/dL    ALT 9 7 - 45 U/L   Lipase   Result Value Ref Range    Lipase 4 (L) 9 - 82 U/L   Human Chorionic Gonadotropin, Serum Quantitative   Result Value Ref Range    HCG, Beta-Quantitative 5 (H) <5 mIU/mL   Lactate   Result Value Ref Range    Lactate 0.7 0.4 - 2.0 mmol/L   Urinalysis with Reflex Culture and Microscopic   Result Value Ref Range    Color, Urine Light-Yellow Light-Yellow, Yellow, Dark-Yellow    Appearance, Urine Turbid (N) Clear    Specific Gravity, Urine 1.007 1.005 - 1.035    pH, Urine 6.5 5.0, 5.5, 6.0, 6.5, 7.0, 7.5, 8.0    Protein, Urine 10 (TRACE) NEGATIVE, 10 (TRACE), 20 (TRACE) mg/dL    Glucose, Urine Normal Normal mg/dL    Blood, Urine 1.0 (3+) (A) NEGATIVE    Ketones, Urine NEGATIVE NEGATIVE mg/dL    Bilirubin, Urine NEGATIVE NEGATIVE    Urobilinogen, Urine Normal Normal mg/dL     Nitrite, Urine NEGATIVE NEGATIVE    Leukocyte Esterase, Urine 75 Josué/µL (A) NEGATIVE   Extra Urine Gray Tube   Result Value Ref Range    Extra Tube Hold for add-ons.    hCG, Urine, Qualitative   Result Value Ref Range    HCG, Urine NEGATIVE NEGATIVE   Microscopic Only, Urine   Result Value Ref Range    WBC, Urine 11-20 (A) 1-5, NONE /HPF    RBC, Urine 3-5 NONE, 1-2, 3-5 /HPF    Squamous Epithelial Cells, Urine 1-9 (SPARSE) Reference range not established. /HPF    Bacteria, Urine 4+ (A) NONE SEEN /HPF    Mucus, Urine FEW Reference range not established. /LPF    Amorphous Crystals, Urine 1+ NONE, 1+, 2+ /HPF   Osmolality, urine   Result Value Ref Range    Osmolality, Urine Random 201 200 - 1,200 mOsm/kg   Sodium, Urine Random   Result Value Ref Range    Sodium, Urine Random <10 mmol/L    Creatinine, Urine Random 68.0 20.0 - 320.0 mg/dL    Sodium/Creatinine Ratio     Blood Culture    Specimen: Peripheral Venipuncture; Blood culture   Result Value Ref Range    Blood Culture Loaded on Instrument - Culture in progress    Blood Culture    Specimen: Peripheral Venipuncture; Blood culture   Result Value Ref Range    Blood Culture Loaded on Instrument - Culture in progress    CBC   Result Value Ref Range    WBC 24.2 (H) 4.4 - 11.3 x10*3/uL    nRBC 0.0 0.0 - 0.0 /100 WBCs    RBC 3.75 (L) 4.00 - 5.20 x10*6/uL    Hemoglobin 11.3 (L) 12.0 - 16.0 g/dL    Hematocrit 33.8 (L) 36.0 - 46.0 %    MCV 90 80 - 100 fL    MCH 30.1 26.0 - 34.0 pg    MCHC 33.4 32.0 - 36.0 g/dL    RDW 12.4 11.5 - 14.5 %    Platelets 223 150 - 450 x10*3/uL   Basic Metabolic Panel   Result Value Ref Range    Glucose 98 74 - 99 mg/dL    Sodium 132 (L) 136 - 145 mmol/L    Potassium 3.7 3.5 - 5.3 mmol/L    Chloride 103 98 - 107 mmol/L    Bicarbonate 24 21 - 32 mmol/L    Anion Gap 9 (L) 10 - 20 mmol/L    Urea Nitrogen 5 (L) 6 - 23 mg/dL    Creatinine 0.56 0.50 - 1.05 mg/dL    eGFR >90 >60 mL/min/1.73m*2    Calcium 8.1 (L) 8.6 - 10.3 mg/dL   Osmolality   Result Value  Ref Range    Osmolality, Serum 272 (L) 280 - 300 mOsm/kg     CT abdomen pelvis w IV contrast    Result Date: 6/3/2024  Interpreted By:  Jody Smith, STUDY: CT ANGIO CHEST FOR PULMONARY EMBOLISM; CT ABDOMEN PELVIS W IV CONTRAST;  6/2/2024 11:07 pm   INDICATION: Signs/Symptoms:Pleuritic pain; Signs/Symptoms:abd pain.   COMPARISON: None.   ACCESSION NUMBER(S): [id number]; [id number]   ORDERING CLINICIAN: VAN LAM   TECHNIQUE: Axial CT images of the chest, abdomen and pelvis with coronal and sagittal reconstructed images obtained after intravenous administration of contrast. Maximum intensity projection images of the chest were reconstructed and reviewed..   FINDINGS: CHEST:   VESSELS: No aortic aneurysm. No pulmonary embolism. HEART: Normal size.  No pericardial effusion. MEDIASTINUM AND VIRIDIANA: Borderline enlarged right hilar lymph nodes measuring up to 10 mm in short axis are likely reactive. LUNG, PLEURA, LARGE AIRWAYS: No pleural effusion or pneumothorax. Dense consolidation in the posterior segment of the right lower lobe. CHEST WALL AND LOWER NECK: Within normal limits. No acute osseous abnormality.   ABDOMEN:   BONES: No acute osseous abnormality. ABDOMINAL WALL: Within normal limits.   LIVER: Within normal limits. BILE DUCTS: No biliary dilatation. GALLBLADDER: No calcified gallstones. No pericholecystic inflammatory changes. PANCREAS: Within normal limits. SPLEEN: Within normal limits. ADRENALS: Within normal limits. KIDNEYS AND URETERS: Symmetric renal enhancement. No hydronephrosis or perinephric fluid collection. Subcentimeter cortical hypodense lesions in the right kidney are too small to characterize, but statistically likely small cysts.  No hydroureter.   VESSELS: The aorta and IVC are within normal limits. RETROPERITONEUM: No pathologically enlarged lymph nodes.   PELVIS:   REPRODUCTIVE ORGANS: No pelvic mass or significant free pelvic fluid. Follicular changes in the right ovary. Small  amount of simple free pelvic fluid is nonspecific but may be physiologic. BLADDER: Within normal limits.   BOWEL: No dilated bowel.  The appendix is not seen. No pericecal inflammatory changes to suggest acute appendicitis. PERITONEUM: No ascites or free air, no fluid collection.       Right lower lobe pneumonia.   No pulmonary embolism.   No acute abdominal or pelvic abnormality. The appendix is not seen with certainty, however, there are no right lower quadrant inflammatory changes to suggest acute appendicitis.   MACRO: None   Signed by: Jody Smith 6/3/2024 12:20 AM Dictation workstation:   [id number]    CT angio chest for pulmonary embolism    Result Date: 6/3/2024  Interpreted By:  Jody Smith, STUDY: CT ANGIO CHEST FOR PULMONARY EMBOLISM; CT ABDOMEN PELVIS W IV CONTRAST;  6/2/2024 11:07 pm   INDICATION: Signs/Symptoms:Pleuritic pain; Signs/Symptoms:abd pain.   COMPARISON: None.   ACCESSION NUMBER(S): [id number]; [id number]   ORDERING CLINICIAN: VAN LMA   TECHNIQUE: Axial CT images of the chest, abdomen and pelvis with coronal and sagittal reconstructed images obtained after intravenous administration of contrast. Maximum intensity projection images of the chest were reconstructed and reviewed..   FINDINGS: CHEST:   VESSELS: No aortic aneurysm. No pulmonary embolism. HEART: Normal size.  No pericardial effusion. MEDIASTINUM AND VIRIDIANA: Borderline enlarged right hilar lymph nodes measuring up to 10 mm in short axis are likely reactive. LUNG, PLEURA, LARGE AIRWAYS: No pleural effusion or pneumothorax. Dense consolidation in the posterior segment of the right lower lobe. CHEST WALL AND LOWER NECK: Within normal limits. No acute osseous abnormality.   ABDOMEN:   BONES: No acute osseous abnormality. ABDOMINAL WALL: Within normal limits.   LIVER: Within normal limits. BILE DUCTS: No biliary dilatation. GALLBLADDER: No calcified gallstones. No pericholecystic inflammatory changes. PANCREAS: Within  normal limits. SPLEEN: Within normal limits. ADRENALS: Within normal limits. KIDNEYS AND URETERS: Symmetric renal enhancement. No hydronephrosis or perinephric fluid collection. Subcentimeter cortical hypodense lesions in the right kidney are too small to characterize, but statistically likely small cysts.  No hydroureter.   VESSELS: The aorta and IVC are within normal limits. RETROPERITONEUM: No pathologically enlarged lymph nodes.   PELVIS:   REPRODUCTIVE ORGANS: No pelvic mass or significant free pelvic fluid. Follicular changes in the right ovary. Small amount of simple free pelvic fluid is nonspecific but may be physiologic. BLADDER: Within normal limits.   BOWEL: No dilated bowel.  The appendix is not seen. No pericecal inflammatory changes to suggest acute appendicitis. PERITONEUM: No ascites or free air, no fluid collection.       Right lower lobe pneumonia.   No pulmonary embolism.   No acute abdominal or pelvic abnormality. The appendix is not seen with certainty, however, there are no right lower quadrant inflammatory changes to suggest acute appendicitis.   MACRO: None   Signed by: Jody Smith 6/3/2024 12:20 AM Dictation workstation:   [id number]    XR chest 2 views    Result Date: 6/2/2024  Interpreted By:  Óscar Cruz, STUDY: XR CHEST 2 VIEWS;  6/2/2024 8:25 pm   INDICATION: Signs/Symptoms:cough.   COMPARISON: 06/21/2019   ACCESSION NUMBER(S): [id number]   ORDERING CLINICIAN: VAN LAM   FINDINGS:     The cardiomediastinal silhouette and pulmonary vasculature are within normal limits. No consolidation, pleural effusion or pneumothorax.         No acute cardiopulmonary process.     MACRO: None.   Signed by: Óscar Cruz 6/2/2024 8:34 PM Dictation workstation:   [id number]        Assessment/Plan   Principal Problem:    Sepsis without septic shock (Multi)    19 year old female with past medical history of ulcerative colitis on Humira, who presented to [facility] ED today with right flank  pain and a cough. States over a week ago, she started to feel ill with intermittent shortness of breath that is worse with deep inspiration, productive cough with green sputum, low back pain, neck pain, nausea, vomiting, and right side/abdominal pain. Continue antibiotics, nebulizers, fluids. Patient will be hospitalized for further medical management.    #Sepsis 2/2 pneumonia  #RLL pneumonia  #Hyponatremia -- improving  #Leukocytosis  #Abdominal pain  #Flank pain  #Nausea/vomiting  #Cough  Admit to inpatient/telemetry to Dr. Marcellus Rivera  See imaging results above  Continue Azithromycin and zosyn for pneumonia  UA not consistent with UTI  Replete electrolytes  NS@75ml/hr  Zofran PRN  Tylenol PRN  Follow blood and urine cultures  Serum and urine osmolality and sodium urine spot ordered  Repeat labs in AM    Chronic conditions  #Ulcerative colitis  Continue home meds as appropriate when nursing completes home med rec  Smoking cessation (refused nicotine patch)  Regular diet  Full code    #DVT prophylaxis  Ambulate  SCD's as tolerated    I spent 55 minutes in the professional and overall care of this patient.    Marcellus Rivera, DO

## 2024-06-03 NOTE — ED PROVIDER NOTES
HPI   Chief Complaint   Patient presents with    Cough    Flank Pain       19-year-old female with a significant past medical history of ulcerative colitis currently on Humira followed by Dr. Nash presents today complaining of right flank pain along with a cough.  The patient reports symptoms for the past few days.  She states that her pain is worse with deep inspiration.  She describes her cough as nonproductive and without a hemoptysis.  She states her pain is also exacerbated by movement.  Denies any UTI-like symptoms.  No reports of diarrhea or constipation.  Denies vomiting.  No reports of rash.  Denies any SOB or difficulty breathing                           [place] Coma Scale Score: 15                     Patient History   Past Medical History:   Diagnosis Date    Other conditions influencing health status     No prior hospitalisations    Ulcerative colitis (Multi)      Past Surgical History:   Procedure Laterality Date    OTHER SURGICAL HISTORY  12/10/2019    Complete colonoscopy    OTHER SURGICAL HISTORY  12/10/2019    Esophagogastroduodenoscopy     No family history on file.  Social History     Tobacco Use    Smoking status: Some Days     Current packs/day: 0.25     Types: Cigarettes    Smokeless tobacco: Never   Vaping Use    Vaping status: Never Used   Substance Use Topics    Alcohol use: Never    Drug use: Never       Physical Exam   ED Triage Vitals   Temperature Heart Rate Respirations BP   06/02/24 1947 06/02/24 1947 06/02/24 1947 06/02/24 1947   37.1 °C (98.8 °F) (!) 102 20 123/82      Pulse Ox Temp Source Heart Rate Source Patient Position   06/02/24 1947 06/02/24 2050 06/02/24 2050 --   95 % Temporal Monitor       BP Location FiO2 (%)     -- --             Physical Exam  Vitals and nursing note reviewed.   Constitutional:       General: She is not in acute distress.     Appearance: Normal appearance. She is normal weight. She is not ill-appearing, toxic-appearing or diaphoretic.   HENT:       Head: Normocephalic.      Nose: Nose normal.      Mouth/Throat:      Mouth: Mucous membranes are moist.   Eyes:      Extraocular Movements: Extraocular movements intact.      Conjunctiva/sclera: Conjunctivae normal.   Cardiovascular:      Rate and Rhythm: Regular rhythm. Tachycardia present.      Pulses: Normal pulses.   Pulmonary:      Effort: Pulmonary effort is normal. No respiratory distress.      Breath sounds: Normal breath sounds.   Abdominal:      General: Abdomen is flat. There is no distension.      Palpations: Abdomen is soft.      Tenderness: There is no right CVA tenderness or left CVA tenderness.      Comments: Tenderness throughout the right upper and lower quadrant as well as the right flank.  Positive guarding.  No distention.   Musculoskeletal:         General: Normal range of motion.      Cervical back: Normal range of motion and neck supple. No rigidity.   Skin:     General: Skin is warm and dry.      Capillary Refill: Capillary refill takes less than 2 seconds.   Neurological:      General: No focal deficit present.      Mental Status: She is alert and oriented to person, place, and time.   Psychiatric:         Mood and Affect: Mood normal.         Behavior: Behavior normal.         Thought Content: Thought content normal.         Judgment: Judgment normal.         ED Course & MDM   ED Course as of 24 0029   Sun  IMPRESSION:  No acute cardiopulmonary process.   [DS]   2150 LEUKOCYTES (10*3/UL) IN BLOOD BY AUTOMATED COUNT, MARTIN(!): 29.7 [DS]   2150 NEUTROPHILS (10*3/UL) IN BLOOD BY AUTOMATED COUNT, MARTIN(!): 25.84 [DS]   2151 SODIUM(!): 128 [DS]   2151 ALT: 9 [DS]   2151 Bilirubin, Total : 1.2 [DS]   2151 AST: 9 [DS]   2151 Creatinine: 0.63 [DS]   2152 LIPASE(!): 4 [DS]   Mon 2024   0027 IMPRESSION:  Right lower lobe pneumonia.      No pulmonary embolism.      No acute abdominal or pelvic abnormality. The appendix is not seen  with certainty,  however, there are no right lower quadrant  inflammatory changes to suggest acute appendicitis.      MACRO:  None   [DS]      ED Course User Index  [DS] Osei Lowery PA-C         Diagnoses as of 06/03/24 0029   Sepsis without septic shock (Multi)   Pneumonia of right lung due to infectious organism, unspecified part of lung   Cystitis       Medical Decision Making  19-year-old female seen and evaluated for flank pain and cough.  Patient found to be tachycardic during initial triage assessment.  She is afebrile.  Examination revealed tenderness and guarding throughout the right abdomen and flank.  Blood work was obtained revealing a significant leukocytosis of 29.7 with left shift.  Patient was also found to be hyponatremic at 128.  No evidence of transaminitis or elevation of the total bilirubin.  Lipase negative.  Given the tachycardia and leukocytosis 2 sets of blood cultures were obtained along with a lactate level.  Patient's lactate level was found to be within normal limits.  Urinalysis was obtained and found to be concerning for urinary tract infection.  Sepsis was recognized.  Patient was given a 30 cc/kg fluid bolus.  CT imaging was ordered and revealed no evidence of pulmonary emboli however the patient was found to have a sizable right lower lobe pneumonia likely explaining her cough and discomfort.  Patient and family were notified of the findings and verbalized understanding.  At this point I recommended admission for further evaluation which they were agreeable to.  Patient be admitted to the medicine on-call service for further evaluation.        Procedure  Procedures     Osei Lowery PA-C  06/03/24 0033

## 2024-06-03 NOTE — CARE PLAN
The patient's goals for the shift include comfort and rest    The clinical goals for the shift include pain control and rest.    Over the shift, the patient is progressing toward goals.

## 2024-06-04 VITALS
DIASTOLIC BLOOD PRESSURE: 56 MMHG | WEIGHT: 111.99 LBS | OXYGEN SATURATION: 97 % | TEMPERATURE: 96.8 F | BODY MASS INDEX: 17.58 KG/M2 | HEIGHT: 67 IN | HEART RATE: 65 BPM | RESPIRATION RATE: 18 BRPM | SYSTOLIC BLOOD PRESSURE: 103 MMHG

## 2024-06-04 LAB
ANION GAP SERPL CALC-SCNC: 11 MMOL/L (ref 10–20)
BACTERIA UR CULT: NORMAL
BUN SERPL-MCNC: 4 MG/DL (ref 6–23)
CALCIUM SERPL-MCNC: 8.1 MG/DL (ref 8.6–10.3)
CHLORIDE SERPL-SCNC: 108 MMOL/L (ref 98–107)
CO2 SERPL-SCNC: 25 MMOL/L (ref 21–32)
CREAT SERPL-MCNC: 0.48 MG/DL (ref 0.5–1.05)
EGFRCR SERPLBLD CKD-EPI 2021: >90 ML/MIN/1.73M*2
ERYTHROCYTE [DISTWIDTH] IN BLOOD BY AUTOMATED COUNT: 12.8 % (ref 11.5–14.5)
GLUCOSE SERPL-MCNC: 96 MG/DL (ref 74–99)
HCT VFR BLD AUTO: 33.3 % (ref 36–46)
HGB BLD-MCNC: 11 G/DL (ref 12–16)
LEGIONELLA AG UR QL: NEGATIVE
MCH RBC QN AUTO: 30.4 PG (ref 26–34)
MCHC RBC AUTO-ENTMCNC: 33 G/DL (ref 32–36)
MCV RBC AUTO: 92 FL (ref 80–100)
NRBC BLD-RTO: 0 /100 WBCS (ref 0–0)
PLATELET # BLD AUTO: 207 X10*3/UL (ref 150–450)
POTASSIUM SERPL-SCNC: 3.8 MMOL/L (ref 3.5–5.3)
RBC # BLD AUTO: 3.62 X10*6/UL (ref 4–5.2)
S PNEUM AG UR QL: NEGATIVE
SODIUM SERPL-SCNC: 140 MMOL/L (ref 136–145)
WBC # BLD AUTO: 12.8 X10*3/UL (ref 4.4–11.3)

## 2024-06-04 PROCEDURE — 99239 HOSP IP/OBS DSCHRG MGMT >30: CPT | Performed by: INTERNAL MEDICINE

## 2024-06-04 PROCEDURE — 85027 COMPLETE CBC AUTOMATED: CPT | Performed by: INTERNAL MEDICINE

## 2024-06-04 PROCEDURE — 36415 COLL VENOUS BLD VENIPUNCTURE: CPT | Performed by: INTERNAL MEDICINE

## 2024-06-04 PROCEDURE — 2500000004 HC RX 250 GENERAL PHARMACY W/ HCPCS (ALT 636 FOR OP/ED)

## 2024-06-04 PROCEDURE — 80048 BASIC METABOLIC PNL TOTAL CA: CPT | Performed by: INTERNAL MEDICINE

## 2024-06-04 PROCEDURE — C9113 INJ PANTOPRAZOLE SODIUM, VIA: HCPCS

## 2024-06-04 RX ORDER — AMOXICILLIN AND CLAVULANATE POTASSIUM 875; 125 MG/1; MG/1
1 TABLET, FILM COATED ORAL 2 TIMES DAILY
Qty: 10 TABLET | Refills: 0 | Status: SHIPPED | OUTPATIENT
Start: 2024-06-04 | End: 2024-06-09

## 2024-06-04 RX ORDER — DOXYCYCLINE 100 MG/1
100 CAPSULE ORAL 2 TIMES DAILY
Qty: 10 CAPSULE | Refills: 0 | Status: SHIPPED | OUTPATIENT
Start: 2024-06-04 | End: 2024-06-09

## 2024-06-04 RX ORDER — GUAIFENESIN 600 MG/1
600 TABLET, EXTENDED RELEASE ORAL 2 TIMES DAILY
Qty: 10 TABLET | Refills: 0 | Status: SHIPPED | OUTPATIENT
Start: 2024-06-04 | End: 2024-06-09

## 2024-06-04 RX ORDER — BENZONATATE 100 MG/1
100 CAPSULE ORAL 3 TIMES DAILY PRN
Qty: 20 CAPSULE | Refills: 0 | Status: SHIPPED | OUTPATIENT
Start: 2024-06-04

## 2024-06-04 RX ADMIN — AZITHROMYCIN MONOHYDRATE 500 MG: 500 INJECTION, POWDER, LYOPHILIZED, FOR SOLUTION INTRAVENOUS at 00:46

## 2024-06-04 RX ADMIN — ACETAMINOPHEN 650 MG: 325 TABLET ORAL at 01:58

## 2024-06-04 RX ADMIN — PIPERACILLIN SODIUM AND TAZOBACTAM SODIUM 3.38 G: 3; .375 INJECTION, SOLUTION INTRAVENOUS at 11:25

## 2024-06-04 RX ADMIN — ACETAMINOPHEN 650 MG: 325 TABLET ORAL at 09:45

## 2024-06-04 RX ADMIN — PIPERACILLIN SODIUM AND TAZOBACTAM SODIUM 3.38 G: 3; .375 INJECTION, SOLUTION INTRAVENOUS at 06:17

## 2024-06-04 RX ADMIN — SODIUM CHLORIDE 100 ML/HR: 9 INJECTION, SOLUTION INTRAVENOUS at 00:47

## 2024-06-04 RX ADMIN — PIPERACILLIN SODIUM AND TAZOBACTAM SODIUM 3.38 G: 3; .375 INJECTION, SOLUTION INTRAVENOUS at 00:46

## 2024-06-04 RX ADMIN — GUAIFENESIN 600 MG: 600 TABLET, EXTENDED RELEASE ORAL at 09:46

## 2024-06-04 RX ADMIN — PANTOPRAZOLE SODIUM 40 MG: 40 INJECTION, POWDER, FOR SOLUTION INTRAVENOUS at 06:17

## 2024-06-04 ASSESSMENT — PAIN - FUNCTIONAL ASSESSMENT
PAIN_FUNCTIONAL_ASSESSMENT: 0-10

## 2024-06-04 ASSESSMENT — PAIN SCALES - GENERAL
PAINLEVEL_OUTOF10: 0 - NO PAIN
PAINLEVEL_OUTOF10: 0 - NO PAIN
PAINLEVEL_OUTOF10: 3
PAINLEVEL_OUTOF10: 6

## 2024-06-04 ASSESSMENT — PAIN DESCRIPTION - LOCATION
LOCATION: BACK
LOCATION: GENERALIZED

## 2024-06-04 NOTE — TREATMENT PLAN
I was asked to discharge the patient by Dr. Rivera. She will be discharged with 5 days of Doxycycline and 5 days of Augmentin and should follow up with Dr. Rivera in 1 week for hospital follow up.

## 2024-06-05 ENCOUNTER — PATIENT OUTREACH (OUTPATIENT)
Dept: CARE COORDINATION | Facility: CLINIC | Age: 19
End: 2024-06-05
Payer: COMMERCIAL

## 2024-06-05 LAB
BACTERIA SPEC RESP CULT: NORMAL
GRAM STN SPEC: NORMAL
GRAM STN SPEC: NORMAL

## 2024-06-05 NOTE — PROGRESS NOTES
[facility] outreach call.    Outreach call to patient to support a smooth transition of care from recent admission.  Left voicemail message for patient with my contact information.  Enrolled patient in [employer] chatbot for additional support and patient education through transition period.  Will continue to monitor through transition period.

## 2024-06-05 NOTE — DISCHARGE SUMMARY
Discharge Diagnosis  Sepsis without septic shock (Multi)    Issues Requiring Follow-Up  PNA, immunosuppression    Test Results Pending At Discharge  Pending Labs       Order Current Status    Blood Culture Preliminary result    Blood Culture Preliminary result    Respiratory Culture/Smear Preliminary result            Hospital Course   19 year old female with past medical history of ulcerative colitis on Humira, who presented to [facility] ED today with right flank pain and a cough. States over a week ago, she started to feel ill with intermittent shortness of breath that is worse with deep inspiration, productive cough with green sputum, low back pain, neck pain, nausea, vomiting, and right side/abdominal pain. Continue antibiotics, nebulizers, fluids. Patient will be hospitalized for further medical management.    #Sepsis 2/2 pneumonia  #RLL pneumonia  #Hyponatremia -- improving  #Leukocytosis  #Abdominal pain  #Flank pain  #Nausea/vomiting  #Cough  Admit to inpatient/telemetry to Dr. Marcellus Rivera  See imaging results above  Continue Azithromycin and zosyn for pneumonia  UA not consistent with UTI  Replete electrolytes  NS@75ml/hr  Zofran PRN  Tylenol PRN  Follow blood and urine cultures  Serum and urine osmolality and sodium urine spot ordered  Repeat labs in AM     Chronic conditions  #Ulcerative colitis  Continue home meds as appropriate when nursing completes home med rec  Smoking cessation (refused nicotine patch)  Regular diet  Full code     #DVT prophylaxis  Ambulate  SCD's as tolerated     6/4: Leukocytosis improved from 24 down to 12.  Sodium improved from 132-140.  IV fluids discontinued.  Patient was discharged home to complete 5 days of doxycycline and Augmentin.  Follow-up with PCP.  Advised to get outpatient labs to monitor leukocytosis.  Also advised to get a repeat chest x-ray in 1 month.      Pertinent Physical Exam At Time of Discharge  Physical Exam  Constitutional:       Appearance: She is  ill-appearing.   HENT:      Head: Normocephalic and atraumatic.      Nose: Nose normal.      Mouth/Throat:      Mouth: Mucous membranes are moist.      Pharynx: Oropharynx is clear.   Eyes:      Extraocular Movements: Extraocular movements intact.      Conjunctiva/sclera: Conjunctivae normal.      Pupils: Pupils are equal, round, and reactive to light.   Cardiovascular:      Rate and Rhythm: Regular rhythm. Tachycardia present.      Pulses: Normal pulses.      Heart sounds: Normal heart sounds.   Pulmonary:      Effort: Pulmonary effort is normal.      Breath sounds: Normal breath sounds.   Abdominal:      General: Abdomen is flat. Bowel sounds are normal.      Palpations: Abdomen is soft.      Tenderness: There is abdominal tenderness. There is right CVA tenderness and left CVA tenderness.      Comments: RUQ and RLQ tenderness with palpation. CVA tenderness bilaterally.   Musculoskeletal:         General: Normal range of motion.      Cervical back: Normal range of motion and neck supple.   Skin:     General: Skin is warm and dry.      Capillary Refill: Capillary refill takes less than 2 seconds.   Neurological:      General: No focal deficit present.      Mental Status: She is alert and oriented to person, place, and time.   Psychiatric:         Mood and Affect: Mood normal.         Behavior: Behavior normal.         Thought Content: Thought content normal.         Judgment: Judgment normal.      Home Medications     Medication List      START taking these medications     amoxicillin-pot clavulanate 875-125 mg tablet; Commonly known as:   Augmentin; Take 1 tablet by mouth 2 times a day for 5 days.   benzonatate 100 mg capsule; Commonly known as: Tessalon; Take 1 capsule   (100 mg) by mouth 3 times a day as needed for cough. Do not crush or chew.   doxycycline 100 mg capsule; Commonly known as: Vibramycin; Take 1   capsule (100 mg) by mouth 2 times a day for 5 days. Take with at least 8   ounces (large glass) of  water, do not lie down for 30 minutes after   guaiFENesin 600 mg 12 hr tablet; Commonly known as: Mucinex; Take 1   tablet (600 mg) by mouth 2 times a day for 5 days. Do not crush, chew, or   split.     CONTINUE taking these medications     Humira(CF) Pen 40 mg/0.4 mL pen injector kit pen-injector; Generic drug:   adalimumab; Inject 1 Pen (40 mg) under the skin every 14 (fourteen) days.   traZODone 50 mg tablet; Commonly known as: Desyrel   venlafaxine XR 75 mg 24 hr capsule; Commonly known as: Effexor-XR       Outpatient Follow-Up  No future appointments.    Marcellus Rivera, DO

## 2024-06-07 LAB
BACTERIA BLD CULT: NORMAL
BACTERIA BLD CULT: NORMAL

## 2024-06-12 ENCOUNTER — SPECIALTY PHARMACY (OUTPATIENT)
Dept: PHARMACY | Facility: CLINIC | Age: 19
End: 2024-06-12

## 2024-06-12 PROCEDURE — RXMED WILLOW AMBULATORY MEDICATION CHARGE

## 2024-06-14 NOTE — DOCUMENTATION CLARIFICATION NOTE
"    PATIENT:               MORENITA JIMENES  ACCT #:                  [id number]  MRN:                       [id number]  :                       2005  ADMIT DATE:       2024 8:00 PM  DISCH DATE:        2024 2:57 PM  RESPONDING PROVIDER #:        [id number]          PROVIDER RESPONSE TEXT:    Gram Negative PNA    CDI QUERY TEXT:    Clarification    Instruction:    Based on your assessment of the patient and the clinical information, please provide the requested documentation by clicking on the appropriate radio button and enter any additional information if prompted.    Question: Please further specify the type of pneumonia being treated    When answering this query, please exercise your independent professional judgment. The fact that a question is being asked, does not imply that any particular answer is desired or expected.    The patient's clinical indicators include:  Clinical Information  18 yo presenting with pneumonia    Clinical Indicators  H and P  \"Sepsis 2/2 pneumonia\", \"Leukocytosis\", \"RLL pneumonia\", \"ill-appearing\", \"she started to feel ill with intermittent shortness of breath that is worse with deep inspiration, productive cough with green sputum\"    WBCs 29.7    CT Chest  \"Dense consolidation in the posterior segment of the right lower lobe\"    Treatment  IV Zosyn -3 days, IV Azithromycin-3 days, IV NS bolus 1497 mls, IV NS bolus 1000 mls, lab monitoring    Risk Factors 18 yo on Humira presenting with pneumonia  Options provided:  -- Gram Negative PNA  -- Pseudomonas PNA  -- Other - I will add my own diagnosis  -- Refer to Clinical Documentation Reviewer    Query created by: Annamarie Glaser on 2024 11:03 AM      Electronically signed by:  NELA ACOSTA DO 2024 5:26 AM          "

## 2024-06-14 NOTE — DOCUMENTATION CLARIFICATION NOTE
"    PATIENT:               MORENITA JIMENES  ACCT #:                  [id number]  MRN:                       [id number]  :                       2005  ADMIT DATE:       2024 8:00 PM  DISCH DATE:        2024 2:57 PM  RESPONDING PROVIDER #:        [id number]          PROVIDER RESPONSE TEXT:    Sepsis was a differential diagnosis and ruled out after study    CDI QUERY TEXT:    Clarification    Instruction:  Based on your assessment of the patient and the clinical information, please provide the requested documentation by clicking on the appropriate radio button and enter any additional information if prompted.    Question: Sepsis was documented in the medical record. Based on the documentation and the clinical information, can the diagnosis be further clarified as    When answering this query, please exercise your independent professional judgment. The fact that a question is being asked, does not imply that any particular answer is desired or expected.    The patient's clinical indicators include:  Clinical Information  20 yo presenting with pneumonia    Documented Diagnosis  H and P  \"Sepsis 2/2 pneumonia\", \"Leukocytosis\", \"RLL pneumonia\"    H and P  \"ill-appearing\"    Clinical Indicators  Vital Signs        -108       RR 20    TMax 37.2  WBC  29.7  Lactic acid  0.7  BUN/Creat  7/0.63  Bilirubin  1.2  [place] Coma Scale  15  Platelets  208    CT Chest  \"Dense consolidation in the posterior segment of the right lower lobe\"    Treatment  IV Zosyn -3 days, IV Azithromycin-3 days, IV NS bolus 1497 mls, IV NS bolus 1000 mls, lab monitoring    Risk Factors 20 yo on Humira presenting with pneumonia  Options provided:  -- Sepsis was a differential diagnosis and ruled out after study  -- Sepsis with other organ dysfunction, Please specify sepsis associated organ dysfunction below  -- Bacteremia without sepsis  -- Other - I will add my own diagnosis  -- Refer to Clinical Documentation Reviewer    Query created " by: Annamarie Glaser on 6/4/2024 10:55 AM      Electronically signed by:  NELA ACOSTA DO 6/14/2024 5:26 AM

## 2024-06-21 ENCOUNTER — PHARMACY VISIT (OUTPATIENT)
Dept: PHARMACY | Facility: CLINIC | Age: 19
End: 2024-06-21
Payer: COMMERCIAL

## 2024-07-08 ENCOUNTER — SPECIALTY PHARMACY (OUTPATIENT)
Dept: PHARMACY | Facility: CLINIC | Age: 19
End: 2024-07-08

## 2024-07-16 ENCOUNTER — SPECIALTY PHARMACY (OUTPATIENT)
Dept: PHARMACY | Facility: CLINIC | Age: 19
End: 2024-07-16

## 2024-07-16 PROCEDURE — RXMED WILLOW AMBULATORY MEDICATION CHARGE

## 2024-07-18 ENCOUNTER — PHARMACY VISIT (OUTPATIENT)
Dept: PHARMACY | Facility: CLINIC | Age: 19
End: 2024-07-18
Payer: COMMERCIAL

## 2024-08-12 ENCOUNTER — SPECIALTY PHARMACY (OUTPATIENT)
Dept: PHARMACY | Facility: CLINIC | Age: 19
End: 2024-08-12

## 2024-08-12 PROCEDURE — RXMED WILLOW AMBULATORY MEDICATION CHARGE

## 2024-08-13 ENCOUNTER — PHARMACY VISIT (OUTPATIENT)
Dept: PHARMACY | Facility: CLINIC | Age: 19
End: 2024-08-13
Payer: COMMERCIAL

## 2024-09-19 ENCOUNTER — SPECIALTY PHARMACY (OUTPATIENT)
Dept: PHARMACY | Facility: CLINIC | Age: 19
End: 2024-09-19

## 2024-09-23 PROCEDURE — RXMED WILLOW AMBULATORY MEDICATION CHARGE

## 2024-09-25 ENCOUNTER — PHARMACY VISIT (OUTPATIENT)
Dept: PHARMACY | Facility: CLINIC | Age: 19
End: 2024-09-25
Payer: COMMERCIAL

## 2024-10-23 ENCOUNTER — SPECIALTY PHARMACY (OUTPATIENT)
Dept: PHARMACY | Facility: CLINIC | Age: 19
End: 2024-10-23

## 2025-04-09 ENCOUNTER — TELEPHONE (OUTPATIENT)
Dept: GASTROENTEROLOGY | Facility: CLINIC | Age: 20
End: 2025-04-09
Payer: COMMERCIAL

## 2025-04-09 NOTE — TELEPHONE ENCOUNTER
Voicemail left for patient. She is past due for a follow up as well. Asked for call back to schedule apt and to also check in regarding Humira adherence.

## 2025-04-09 NOTE — TELEPHONE ENCOUNTER
----- Message from Codie ORTA sent at 4/9/2025  8:14 AM EDT -----  Regarding: Humira Pen    Good morning       I would like to notify you that despite a number of attempts over the last 30 days we have been unable to contact this patient in regards to their prescription referral and we are unable to proceed with dispensing the medication. The last contact with the patient was on 04/09/25 in regards to Humira Pen. The patient has not completed Humira Pen. At this time, we consider the patient lost to follow up and will place the prescription on file.    Thanks   Georgette ORTA